# Patient Record
Sex: FEMALE | Race: WHITE | Employment: UNEMPLOYED | URBAN - NONMETROPOLITAN AREA
[De-identification: names, ages, dates, MRNs, and addresses within clinical notes are randomized per-mention and may not be internally consistent; named-entity substitution may affect disease eponyms.]

---

## 2022-02-17 ENCOUNTER — APPOINTMENT (OUTPATIENT)
Dept: CT IMAGING | Age: 32
DRG: 885 | End: 2022-02-17
Attending: PSYCHIATRY & NEUROLOGY
Payer: MEDICAID

## 2022-02-17 ENCOUNTER — HOSPITAL ENCOUNTER (INPATIENT)
Age: 32
LOS: 5 days | Discharge: HOME | DRG: 885 | End: 2022-02-22
Attending: PSYCHIATRY & NEUROLOGY | Admitting: PSYCHIATRY & NEUROLOGY
Payer: MEDICAID

## 2022-02-17 DIAGNOSIS — F43.10 PTSD (POST-TRAUMATIC STRESS DISORDER): Primary | ICD-10-CM

## 2022-02-17 LAB — TSH SERPL DL<=0.05 MIU/L-ACNC: 1.97 UIU/ML (ref 0.27–4.2)

## 2022-02-17 PROCEDURE — 85025 COMPLETE CBC W/AUTO DIFF WBC: CPT

## 2022-02-17 PROCEDURE — 90792 PSYCH DIAG EVAL W/MED SRVCS: CPT | Performed by: NURSE PRACTITIONER

## 2022-02-17 PROCEDURE — 74177 CT ABD & PELVIS W/CONTRAST: CPT

## 2022-02-17 PROCEDURE — 6370000000 HC RX 637 (ALT 250 FOR IP): Performed by: NURSE PRACTITIONER

## 2022-02-17 PROCEDURE — 84443 ASSAY THYROID STIM HORMONE: CPT

## 2022-02-17 PROCEDURE — 83690 ASSAY OF LIPASE: CPT

## 2022-02-17 PROCEDURE — 82607 VITAMIN B-12: CPT

## 2022-02-17 PROCEDURE — 36415 COLL VENOUS BLD VENIPUNCTURE: CPT

## 2022-02-17 PROCEDURE — 1240000000 HC EMOTIONAL WELLNESS R&B

## 2022-02-17 PROCEDURE — 6360000004 HC RX CONTRAST MEDICATION: Performed by: FAMILY MEDICINE

## 2022-02-17 PROCEDURE — 86140 C-REACTIVE PROTEIN: CPT

## 2022-02-17 PROCEDURE — 80053 COMPREHEN METABOLIC PANEL: CPT

## 2022-02-17 PROCEDURE — 6370000000 HC RX 637 (ALT 250 FOR IP): Performed by: PSYCHIATRY & NEUROLOGY

## 2022-02-17 PROCEDURE — 82306 VITAMIN D 25 HYDROXY: CPT

## 2022-02-17 RX ORDER — ACETAMINOPHEN 325 MG/1
650 TABLET ORAL EVERY 4 HOURS PRN
Status: DISCONTINUED | OUTPATIENT
Start: 2022-02-17 | End: 2022-02-22 | Stop reason: HOSPADM

## 2022-02-17 RX ORDER — CLONAZEPAM 0.5 MG/1
0.5 TABLET ORAL 2 TIMES DAILY PRN
Status: DISCONTINUED | OUTPATIENT
Start: 2022-02-17 | End: 2022-02-19

## 2022-02-17 RX ORDER — POLYETHYLENE GLYCOL 3350 17 G/17G
17 POWDER, FOR SOLUTION ORAL DAILY PRN
Status: DISCONTINUED | OUTPATIENT
Start: 2022-02-17 | End: 2022-02-22 | Stop reason: HOSPADM

## 2022-02-17 RX ORDER — ASENAPINE MALEATE 2.5 MG/1
2.5 TABLET SUBLINGUAL 2 TIMES DAILY PRN
Status: DISCONTINUED | OUTPATIENT
Start: 2022-02-17 | End: 2022-02-22 | Stop reason: HOSPADM

## 2022-02-17 RX ORDER — PANTOPRAZOLE SODIUM 40 MG/1
40 TABLET, DELAYED RELEASE ORAL
Status: DISCONTINUED | OUTPATIENT
Start: 2022-02-17 | End: 2022-02-22 | Stop reason: HOSPADM

## 2022-02-17 RX ORDER — LITHIUM CARBONATE 300 MG/1
300 CAPSULE ORAL 2 TIMES DAILY WITH MEALS
Status: DISCONTINUED | OUTPATIENT
Start: 2022-02-17 | End: 2022-02-21

## 2022-02-17 RX ORDER — DOXEPIN HYDROCHLORIDE 10 MG/1
10 CAPSULE ORAL NIGHTLY PRN
Status: DISCONTINUED | OUTPATIENT
Start: 2022-02-17 | End: 2022-02-18

## 2022-02-17 RX ORDER — NICOTINE 21 MG/24HR
1 PATCH, TRANSDERMAL 24 HOURS TRANSDERMAL DAILY
Status: DISCONTINUED | OUTPATIENT
Start: 2022-02-17 | End: 2022-02-17

## 2022-02-17 RX ADMIN — LITHIUM CARBONATE 300 MG: 300 CAPSULE, GELATIN COATED ORAL at 11:25

## 2022-02-17 RX ADMIN — LITHIUM CARBONATE 300 MG: 300 CAPSULE, GELATIN COATED ORAL at 17:00

## 2022-02-17 RX ADMIN — ACETAMINOPHEN 650 MG: 325 TABLET ORAL at 12:59

## 2022-02-17 RX ADMIN — IOPAMIDOL 90 ML: 755 INJECTION, SOLUTION INTRAVENOUS at 16:36

## 2022-02-17 RX ADMIN — DOXEPIN HYDROCHLORIDE 10 MG: 10 CAPSULE ORAL at 21:11

## 2022-02-17 RX ADMIN — PANTOPRAZOLE SODIUM 40 MG: 40 TABLET, DELAYED RELEASE ORAL at 11:44

## 2022-02-17 RX ADMIN — CLONAZEPAM 0.5 MG: 0.5 TABLET ORAL at 13:34

## 2022-02-17 ASSESSMENT — SLEEP AND FATIGUE QUESTIONNAIRES
DIFFICULTY FALLING ASLEEP: YES
DIFFICULTY STAYING ASLEEP: YES
RESTFUL SLEEP: YES
AVERAGE NUMBER OF SLEEP HOURS: 6
DIFFICULTY ARISING: NO
DO YOU HAVE DIFFICULTY SLEEPING: YES
DO YOU USE A SLEEP AID: YES

## 2022-02-17 ASSESSMENT — LIFESTYLE VARIABLES: HISTORY_ALCOHOL_USE: NO

## 2022-02-17 ASSESSMENT — PAIN SCALES - GENERAL: PAINLEVEL_OUTOF10: 6

## 2022-02-17 ASSESSMENT — PATIENT HEALTH QUESTIONNAIRE - PHQ9: SUM OF ALL RESPONSES TO PHQ QUESTIONS 1-9: 27

## 2022-02-17 NOTE — FLOWSHEET NOTE
Patient offered Psychiatric Advance Directive and patient said that she thinks she wants her mom to make decisions for her. \"I feel like I'm a little kid right now. \"

## 2022-02-17 NOTE — PROGRESS NOTES
Admission Note      Reason for admission/Target Symptom: Patient was a direct admit from [address] to [facility] due after bad outsburts of rage, Ignacio Seip took me off of all my meds and put me on Seroquel and it hasn't helped. It's made it worse. I went and saw my psychiatrist and I was shaking and crying and I didn't know what to do. He asked me if I was suicidal and I kept  saying \"no no no\" but when I got home all I could think to do was going to the kitchen and getting a knife and slitting my wrist and ending it. \" Pt reports depression, anxiety, decreased appetite, and fatigue. Pt reports a hx of cutting from ages [age]. Diagnoses: Bipolar I Disorder; PTSD; Depression; Anxiety    UDS: Negative  BAL: Negative <10     SW met with treatment team to discuss patient's treatment including care planning, discharge planning, and follow-up needs. Pt has been admitted to [facility]. Treatment team has identified patient's discharge needs as medication management and outpatient therapy/counseling. Pt confirmed  the need for ongoing treatment post inpatient stay. Pt was also provided a handout of contact information for drug and alcohol treatment centers and other community support service such as MISTY, AA, and [facility] Recovery.
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
## 2022-02-17 NOTE — H&P
Behavioral Services  Medicare Certification Upon Admission    I certify that this patient's inpatient psychiatric hospital admission is medically necessary for:    [x] (1) Treatment which could reasonably be expected to improve this patient's condition,       [] (2) Or for diagnostic study;     AND     [x](2) The inpatient psychiatric services are provided while the individual is under the care of a physician and are included in the individualized plan of care.     Estimated length of stay/service 3 days- 5 weeks    Plan for post-hospital care: TBA    Electronically signed by OJSE Lopez on 2/17/2022 at 10:50 AM

## 2022-02-17 NOTE — PROGRESS NOTES
Patient had Klonopin in her home medications that was stored in a plastic tub. Counted meds contained in the bottle with Yola Estrada and sealed bottle in envelope and placed back in tub and tub was then placed, also containing her other home meds, back in the pharmacy cabinet.

## 2022-02-17 NOTE — PROGRESS NOTES
Admission Note      Reason for admission/Target Symptom: Patient admitted to [facility] due to Patient had Klonopin in her home medications that was stored in a plastic tub. Counted meds contained in the bottle with Beth Brewer and sealed bottle in envelope and placed back in tub and tub was then placed, also containing her other home meds, back in the pharmacy cabinet  Diagnoses: Depression NOS  UDS: Neg  BAL:  Neg    SW met with treatment team to discuss patient's treatment including care planning, discharge planning, and follow-up needs. Pt has been admitted to [facility]. Treatment team has identified patient's discharge needs as medication management and outpatient therapy/counseling. Pt confirmed  the need for ongoing treatment post inpatient stay. Pt was also provided a handout of contact information for drug and alcohol treatment centers and other community support service such as MISTY, AA, and [facility].
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
## 2022-02-17 NOTE — PROGRESS NOTES
Treatment Team Note:    BELINDA met with [id number] team to discuss Pts [address] plans. Progress/Behavior/Group Attendance: TBD    Target Symptoms/Reason for admission: Patient admitted to [facility] due to Patient had Klonopin in her home medications that was stored in a plastic tub. Counted meds contained in the bottle with Mickie Councilman and sealed bottle in envelope and placed back in tub and tub was then placed, also containing her other home meds, back in the pharmacy cabinet  Diagnoses: Bipolar I Disorder, Borderline Personality Disorder, PTSD  Tobacco use disorder   UDS: Neg  BAL:  Neg  AftercarePlan: [address] lives with: SW will meet with pt to gather information. Collateral obtained from: SW will meet with pt to gather release of information.   On:    Family Session: STEPHEN    Misc:

## 2022-02-18 ENCOUNTER — APPOINTMENT (OUTPATIENT)
Dept: CT IMAGING | Age: 32
DRG: 885 | End: 2022-02-18
Attending: PSYCHIATRY & NEUROLOGY
Payer: MEDICAID

## 2022-02-18 LAB
ALBUMIN SERPL-MCNC: 4.7 G/DL (ref 3.5–5.2)
ALP BLD-CCNC: 78 U/L (ref 35–104)
ALT SERPL-CCNC: 14 U/L (ref 5–33)
ANION GAP SERPL CALCULATED.3IONS-SCNC: 10 MMOL/L (ref 7–19)
AST SERPL-CCNC: 14 U/L (ref 5–32)
BACTERIA: ABNORMAL /HPF
BASOPHILS ABSOLUTE: 0 K/UL (ref 0–0.2)
BASOPHILS RELATIVE PERCENT: 0.4 % (ref 0–1)
BILIRUB SERPL-MCNC: <0.2 MG/DL (ref 0.2–1.2)
BILIRUBIN URINE: NEGATIVE
BLOOD, URINE: NEGATIVE
BUN BLDV-MCNC: 10 MG/DL (ref 6–20)
C-REACTIVE PROTEIN: <0.3 MG/DL (ref 0–0.5)
CALCIUM SERPL-MCNC: 9.7 MG/DL (ref 8.6–10)
CHLORIDE BLD-SCNC: 103 MMOL/L (ref 98–111)
CLARITY: CLEAR
CO2: 27 MMOL/L (ref 22–29)
COLOR: YELLOW
CREAT SERPL-MCNC: 0.7 MG/DL (ref 0.5–0.9)
CRYSTALS, UA: ABNORMAL /HPF
EOSINOPHILS ABSOLUTE: 0.1 K/UL (ref 0–0.6)
EOSINOPHILS RELATIVE PERCENT: 1.7 % (ref 0–5)
EPITHELIAL CELLS, UA: 3 /HPF (ref 0–5)
GFR AFRICAN AMERICAN: >59
GFR NON-AFRICAN AMERICAN: >60
GLUCOSE BLD-MCNC: 107 MG/DL (ref 74–109)
GLUCOSE URINE: NEGATIVE MG/DL
HCT VFR BLD CALC: 43.1 % (ref 37–47)
HEMOGLOBIN: 13.3 G/DL (ref 12–16)
HYALINE CASTS: 1 /HPF (ref 0–8)
IMMATURE GRANULOCYTES #: 0 K/UL
KETONES, URINE: NEGATIVE MG/DL
LEUKOCYTE ESTERASE, URINE: ABNORMAL
LIPASE: 16 U/L (ref 13–60)
LYMPHOCYTES ABSOLUTE: 1.8 K/UL (ref 1.1–4.5)
LYMPHOCYTES RELATIVE PERCENT: 23 % (ref 20–40)
MCH RBC QN AUTO: 31.8 PG (ref 27–31)
MCHC RBC AUTO-ENTMCNC: 30.9 G/DL (ref 33–37)
MCV RBC AUTO: 103.1 FL (ref 81–99)
MONOCYTES ABSOLUTE: 0.5 K/UL (ref 0–0.9)
MONOCYTES RELATIVE PERCENT: 5.8 % (ref 0–10)
NEUTROPHILS ABSOLUTE: 5.3 K/UL (ref 1.5–7.5)
NEUTROPHILS RELATIVE PERCENT: 69 % (ref 50–65)
NITRITE, URINE: NEGATIVE
PDW BLD-RTO: 13 % (ref 11.5–14.5)
PH UA: 7 (ref 5–8)
PLATELET # BLD: 296 K/UL (ref 130–400)
PMV BLD AUTO: 10.5 FL (ref 9.4–12.3)
POTASSIUM SERPL-SCNC: 4.3 MMOL/L (ref 3.5–5)
PROTEIN UA: NEGATIVE MG/DL
RBC # BLD: 4.18 M/UL (ref 4.2–5.4)
RBC UA: 2 /HPF (ref 0–4)
SODIUM BLD-SCNC: 140 MMOL/L (ref 136–145)
SPECIFIC GRAVITY UA: 1.01 (ref 1–1.03)
TOTAL PROTEIN: 6.6 G/DL (ref 6.6–8.7)
UROBILINOGEN, URINE: 0.2 E.U./DL
VITAMIN B-12: 648 PG/ML (ref 211–946)
VITAMIN D 25-HYDROXY: 33.8 NG/ML
WBC # BLD: 7.7 K/UL (ref 4.8–10.8)
WBC UA: 6 /HPF (ref 0–5)

## 2022-02-18 PROCEDURE — 1240000000 HC EMOTIONAL WELLNESS R&B

## 2022-02-18 PROCEDURE — 6370000000 HC RX 637 (ALT 250 FOR IP): Performed by: PSYCHIATRY & NEUROLOGY

## 2022-02-18 PROCEDURE — 6370000000 HC RX 637 (ALT 250 FOR IP): Performed by: NURSE PRACTITIONER

## 2022-02-18 PROCEDURE — 70450 CT HEAD/BRAIN W/O DYE: CPT

## 2022-02-18 PROCEDURE — 81001 URINALYSIS AUTO W/SCOPE: CPT

## 2022-02-18 PROCEDURE — 99231 SBSQ HOSP IP/OBS SF/LOW 25: CPT | Performed by: NURSE PRACTITIONER

## 2022-02-18 RX ORDER — DOCUSATE SODIUM 100 MG/1
100 CAPSULE, LIQUID FILLED ORAL DAILY
Status: DISCONTINUED | OUTPATIENT
Start: 2022-02-18 | End: 2022-02-22 | Stop reason: HOSPADM

## 2022-02-18 RX ORDER — DOXEPIN HYDROCHLORIDE 25 MG/1
25 CAPSULE ORAL NIGHTLY PRN
Status: DISCONTINUED | OUTPATIENT
Start: 2022-02-18 | End: 2022-02-22 | Stop reason: HOSPADM

## 2022-02-18 RX ADMIN — DOXEPIN HYDROCHLORIDE 25 MG: 25 CAPSULE ORAL at 21:36

## 2022-02-18 RX ADMIN — CLONAZEPAM 0.5 MG: 0.5 TABLET ORAL at 22:45

## 2022-02-18 RX ADMIN — LITHIUM CARBONATE 300 MG: 300 CAPSULE, GELATIN COATED ORAL at 08:18

## 2022-02-18 RX ADMIN — PANTOPRAZOLE SODIUM 40 MG: 40 TABLET, DELAYED RELEASE ORAL at 08:17

## 2022-02-18 RX ADMIN — ASENAPINE MALEATE 2.5 MG: 2.5 TABLET SUBLINGUAL at 00:01

## 2022-02-18 RX ADMIN — DOCUSATE SODIUM 100 MG: 100 CAPSULE, LIQUID FILLED ORAL at 14:08

## 2022-02-18 RX ADMIN — ACETAMINOPHEN 650 MG: 325 TABLET ORAL at 20:10

## 2022-02-18 RX ADMIN — CLONAZEPAM 0.5 MG: 0.5 TABLET ORAL at 15:40

## 2022-02-18 RX ADMIN — DOCUSATE SODIUM 100 MG: 100 CAPSULE, LIQUID FILLED ORAL at 15:38

## 2022-02-18 RX ADMIN — LITHIUM CARBONATE 300 MG: 300 CAPSULE, GELATIN COATED ORAL at 17:40

## 2022-02-18 RX ADMIN — ASENAPINE MALEATE 2.5 MG: 2.5 TABLET SUBLINGUAL at 11:33

## 2022-02-18 ASSESSMENT — PAIN SCALES - GENERAL
PAINLEVEL_OUTOF10: 2
PAINLEVEL_OUTOF10: 7

## 2022-02-18 NOTE — PROGRESS NOTES
BELINDA met with [address] [place] [address] team to discuss Pts [place] and DC plans.      Progress/Behavior/Group Attendance: TBD     Target Symptoms/Reason for admission: Patient admitted to [facility] due to Patient had Klonopin in her home medications that was stored in a plastic tub. Counted meds contained in the bottle with Yecenia Naylor and sealed bottle in envelope and placed back in tub and tub was then placed, also containing her other home meds, back in the pharmacy cabinet  Diagnoses: Bipolar I Disorder, Borderline Personality Disorder, PTSD  Tobacco use disorder   UDS: Neg  BAL:  Neg  AftercarePlan: [address] lives with: SW will meet with pt to gather information.     Collateral obtained from: SW will meet with pt to gather release of information.   On:     Family Session: STEPHEN     Misc:

## 2022-02-18 NOTE — PLAN OF CARE
Problem: Health Maintenance - Impaired:  Goal: Ability to perform activities of daily living will improve  Description: Ability to perform activities of daily living will improve  Outcome: Ongoing     Group Therapy Note     Date: 2/18/2022  Start Time: 1000  End Time:  7557  Number of Participants: 9     Type of Group: Psychotherapy     Patient's Goal: Patient will process emotions and actions and how to relate to other or their with others. Patient discussed open communication and feelings and emotions. Notes:  Patient attended process group as scheduled, patient identified a issue to work on today regarding how they will interact and relate to others. Status After Intervention:  Improved     Participation Level:  Active Listener     Participation Quality: Appropriate, Attentive, and Sharing        Speech:  normal        Thought Process/Content: Logical        Affective Functioning: Congruent        Mood: euthymic        Level of consciousness:  Alert        Response to Learning: Able to verbalize current knowledge/experience        Endings: None Reported     Modes of Intervention: Education, Support, and Socialization        Discipline Responsible: /Counselor        Signature:  Clark Goltz, [facility]

## 2022-02-18 NOTE — PROGRESS NOTES
WRAP UP GROUP NOTE    Patient's Goal:  Providing feedback as to their own progress in the care-plan provided. Patients have an opportunity to explore self-reflective skills and share any additional cares and concerns not yet addressed. Pt effectively participated.     Energy Level:low  Appetite:improving  Concentration:improving  Hallucinations:less  Depression:improved  Anxiety:on/off  How I worked today:tried a little  What helps me sleep:relax  Any questions/complaints/comments:    Electronically signed by Jennifer Webster RN on 2/17/2022 at 9:35 PM

## 2022-02-18 NOTE — PROGRESS NOTES
Requirement Note     SW met with pt to complete Psychosocial and CSSR-S on this date. Patients long and short term goals discussed. Patient voiced understanding. Treatment plan sheet signed. Patient verbalized understanding of the treatment plan. Patient participated in goals and objectives of the treatment plan. Patient discussed safety plan with . SW explained treatment goals with pt. Decreasing depression and anxiety by improvement of positive coping patterns was discussed. Pt acknowledged understanding of treatment goals and signed treatment plan signature sheet. In the last 6 months has the pt been a danger to self: YES  In the last 6 months has the pt been a danger to others: NO  Legal Guardian/POA: NO     Provided patient with [employer] Online handout entitled \"Quitting Smoking. \"  Reviewed handout with patient: addressing dangers of smoking, developing coping skills, and providing basic information about quitting. Patient received all components practical counseling of tobacco practical counseling during the hospital stay.

## 2022-02-18 NOTE — FLOWSHEET NOTE
02/17/22 2027   Encounter Summary   Services provided to: Patient   Referral/Consult From: Nurse   Support System Parent; Family members   Complexity of Encounter High   Length of Encounter 1 hour   Spiritual/Mormon   Type Spiritual struggle   Assessment Tearful; Anxious; Angry   Intervention Active listening;Explored feelings, thoughts, concerns;Prayer   Outcome Expressed gratitude     Gabriela was willing to receive God's help resolving her issues, including healing of mental disorder. Discussed her relationship with God, with self, and family members, her daughter and son. She is thankful for spiritual support and will get a Bible from the nurse.    Electronically signed by Tonio Cazares on 2/17/2022 at 8:37 PM

## 2022-02-18 NOTE — PROGRESS NOTES
[address] [place] [address]      Psychiatric Progress Note    Name:  Harleen Chowdhury  Date:  2/18/2022  Age:  36 y.o. Sex:  female  Ethnicity:   Primary Care Physician:  Deangelo Wong MD, MD   Patient Care Team:  Patient Care Team:  Deangelo Wong MD as PCP - General (Family Medicine)  Chief Complaint: \"I just want my brain to stop, I just want to die. \"        Historian:patient  Complaint Type: anxiety, decreased appetite, depression, irritability, loss of interest in favorite activities, mood swings and sleep disturbance  Course of Symptoms: ongoing  Precipitating Factors: history of mental illness      Subjective  Nursing notes reviewed and patient had no behavioral issues during the night. As needed medications administered during the night include Saphris. Today she endorses suicidal ideation with no specific plan. She states, \"I just want to be dead and I want my brain to stop. \"  She denies homicidal ideation and psychosis. She is emotionally labile at this time. She was in group therapy and left group therapy slammed the door and went to her room and began hysterically crying. She states, \"I just want my brain to stop, I just want to die. \"  Today she reports that she has a history of pseudoseizures. She is also worried about ongoing symptoms since having COVID last month. She endorses numbness of her body that only happens on the right side as well as dizziness and migraines since having COVID. She becomes very upset gets up from her bed and yells \"there is something wrong with my brain this is not just mental illness! \"  Today she reports depression as an 8 and anxiety as a 5 on a 0-to-10 scale. She becomes tearful when talking about her oldest daughter whom her aunt and uncle have custody of. Reports that she has a 11year-old son who she feels is scared of her because of her frequent psychiatric hospitalizations.   She was educated on borderline personality disorder and states, Rubén Shipman has no one told me this before this describes me exactly. \" She was administered a prn Saphris to help with her agitation and was able to lay down and rest.  Patient reports sleep as \"it was terrible. \"  Patient has been calm and cooperative with staff and peers. Patient has been compliant with medications. Patient has been attending groups. Patient reports appetite as            Patient reports no side effects from medications. Objective  Vitals:    02/18/22 0911   BP: 129/71   Pulse: 85   Resp: 20   Temp: 96.9 °F (36.1 °C)   SpO2: 100%       Previous Psychiatric/Substance Use History      Medical History:  No past medical history on file. HOLT History:   Social History     Substance and Sexual Activity   Alcohol Use Not on file         Social History     Substance and Sexual Activity   Drug Use Not on file        Social History     Tobacco Use   Smoking Status Not on file   Smokeless Tobacco Not on file        Family History:     No family history on file. Mental Status:  Level of consciousness:  within normal limits and awake  Appearance:  well-appearing, street clothes, seated in bed, good grooming and good hygiene  Behavior/Motor:  agitated  Attitude toward examiner:  cooperative, attentive and good eye contact  Speech:  loud  Mood:  \"I just need my brain to stop. \"  Affect:  intense  Thought processes:  rapid  Thought content:  Homocidal ideation :denies  Suicidal Ideation:  active  Delusions:  no evidence of delusions  Perceptual Disturbance:  denies any perceptual disturbance  Cognition:  oriented to person, place, and time  Concentration : poor  Memory intact for recent   Fund of knowledge:  average  Abstract thinking: adequate  Insight: poor  Judgment:  poor     lithium  300 mg Oral BID WC    nicotine  1 patch TransDERmal Daily    pantoprazole  40 mg Oral QAM AC       Current Medications:  Current Facility-Administered Medications   Medication Dose Route Frequency Provider Last Rate Last Admin  acetaminophen (TYLENOL) tablet 650 mg  650 mg Oral Q4H PRN Valerio Dyer MD   650 mg at 02/17/22 1259    polyethylene glycol (GLYCOLAX) packet 17 g  17 g Oral Daily PRN Valerio Dyer MD        lithium capsule 300 mg  300 mg Oral BID WC Aura Smoot, APRN   300 mg at 02/18/22 0818    asenapine maleate (SAPHRIS) sublingual tablet 2.5 mg  2.5 mg SubLINGual BID PRN Cristhiana Smoot, APRN   2.5 mg at 02/18/22 1133    doxepin (SINEQUAN) capsule 10 mg  10 mg Oral Nightly PRN Cristhiana Leeroy, APRN   10 mg at 02/17/22 2111    nicotine (NICODERM CQ) 7 MG/24HR 1 patch  1 patch TransDERmal Daily Monalisa Umaña APRN   1 patch at 02/17/22 1124    pantoprazole (PROTONIX) tablet 40 mg  40 mg Oral QAM AC Christy Winn, APRN   40 mg at 02/18/22 0550    clonazePAM (KLONOPIN) tablet 0.5 mg  0.5 mg Oral BID PRN Cristhiana Smoot, APRN   0.5 mg at 02/17/22 1334       Psychotherapy:   SUPPORTIVE    DSM V Diagnoses: Active Problems:    Borderline personality disorder ([address]    Bipolar I disorder with mixed features ([address]    Tobacco use disorder    PTSD (post-traumatic stress disorder)  Resolved Problems:    * No resolved hospital problems. *            Plan:    Encourage group therapy  15 minute safety checks  Medical monitoring by Dr. Bel Ureña and associates  Continue current therapy and medications  Social work to obtain collateral   CT BRAIN WITHOUT CONTRAST- NUMBNESS ON RIGHT SIDE OF HER BODY, DIZZINESS AND MIGRAINES SINCE HAVING COVID LAST MONTH    Amount of time spent with patient: 15 minutes with greater than 50% of the time spent in counseling and collaboration of care.     JOSE Villaseñor  Clinician Signature: signed electronically

## 2022-02-18 NOTE — PROGRESS NOTES
[facility] Adult Unit Daily Assessment  Nursing Progress Note    Room: [facility]/610-01   Name: Torri Aguayo   Age: 36 y.o. Gender: female   Dx: <principal problem not specified>  Precautions: suicide risk  Inpatient Status: voluntary       SLEEP:    Sleep Quality Poor   Sleep Medications: No   PRN Sleep Meds: Yes       MEDICAL:    Other PRN Meds: Yes   Med Compliant: Yes  Accu-Chek: No  Oxygen/CPAP/BiPAP: No  CIWA/CINA: No   PAIN Assessment: none  Side Effects from medication: No    Is Patient experiencing any respiratory symptoms (headache, fever, body aches, cough. Lex Nathan ): no  Patient educated by nursing to practice social distancing, wear masks, wash hands frequently: yes      PSYCH:    Depression: 6   Anxiety: 5   SI active   HI Negative for homicidal ideation      AVH:Absent      GENERAL:    Appetite: good    Social: Yes   Speech: normal   Appearance: appropriately dressed and healthy looking    GROUP:    Group Participation: Yes  Participation Quality: Active Listener    Notes: Patient in room, laying in bed at this encounter. Eye contact was good. PT says she has no trouble falling asleep but has trouble staying asleep. Patient is still having SI with a plan to cut wrists. She states that she constantly has thoughts of harming herself but no one else. Calm, cooperative and social with staff. PT mentioned also that she is on prazosin at home and it is not been RX'd to be taken while [facility].      Electronically signed by Iraida Diop RN on 2/17/22 at 9:32 PM CST

## 2022-02-18 NOTE — PLAN OF CARE
Group Therapy Note     Date: 2/18/2022  Start Time: 1100  End Time:  1255  Number of Participants: 8     Type of Group: Psychoeducation     Wellness Binder Information  Module Name:  staying well  Session Number:  1     Patient's Goal:  daily maintenance coping skills     Notes:  pt was verbally prompted to attend group. Pt refused. Information about staying well was provided. Status After Intervention:       Participation Level:      Participation Quality:         Speech:           Thought Process/Content:         Affective Functioning:         Mood:         Level of consciousness:          Response to Learning:         Endings:      Modes of Intervention:         Discipline Responsible: Psychoeducational Specialist        Signature:  Flavio Landis

## 2022-02-18 NOTE — PROGRESS NOTES
Called and informed Dr. Ewing Galeazzi and Gurinder Cervantes about final results of patient's CT scan.

## 2022-02-18 NOTE — PROGRESS NOTES
Treatment Team Note:     BELINDA met with [id number] team to discuss Pts TX and DC plans.      Progress/Behavior/Group Attendance: TBD     Target Symptoms/Reason for admission: Patient admitted to [facility] due to Patient had Klonopin in her home medications that was stored in a plastic tub. Counted meds contained in the bottle with Mena Polite and sealed bottle in envelope and placed back in tub and tub was then placed, also containing her other home meds, back in the pharmacy cabinet  Diagnoses: Bipolar I Disorder, Borderline Personality Disorder, PTSD  Tobacco use disorder   UDS: Neg  BAL:  Neg  AftercarePlan: [address] lives with: SW will meet with pt to gather information.     Collateral obtained from: SW will meet with pt to gather release of information.   On:     Family Session: STEPHEN     Misc:

## 2022-02-18 NOTE — PROGRESS NOTES
[facility] Adult Unit Daily Assessment  Nursing Progress Note    Room: [facility]610-01   Name: Isela Holter   Age: 36 y.o. Gender: female   Dx: <principal problem not specified>  Precautions: suicide risk  Inpatient Status: voluntary       SLEEP:    Sleep Quality Poor  Sleep Medications:    PRN Sleep Meds:        MEDICAL:    Other PRN Meds:    Med Compliant:   Accu-Chek:   Oxygen/CPAP/BiPAP:   CIWA/CINA:    PAIN Assessment: none  Side Effects from medication: No    Is Patient experiencing any respiratory symptoms (headache, fever, body aches, cough. Aki Skates ): no  Patient educated by nursing to practice social distancing, wear masks, wash hands frequently: yes      PSYCH:    Depression: 9   Anxiety: 9   SI active   HI Negative for homicidal ideation      AVH:Absent      GENERAL:    Appetite: decreased    Social: No   Speech: normal   Appearance: appropriately dressed and healthy looking    GROUP:    Group Participation: No  Participation Quality: Minimal    Notes:  Patient reports nausea. She had refused her Protonix earlier. She is tearful and requesting it now. She is in her room crying sitting in the bathroom floor. She agree to lie down. She states she has nothing to live for. \" my kids don't need me anymore\" I just want my body to stop hurting\" offered her Saphris. She refused stating \"the last one made my tongue numb and I passed out. Yuki Adam, APRN here and aware of behavior. Saphris given. Patient calm and has been in the day area for most of the remainder of the day playing games and laughing. Urine obtained. She previously had CT of head.          Electronically signed by Carolina Daugherty RN on 2/18/22 at 11:45 AM CST

## 2022-02-18 NOTE — H&P
HISTORY and PHYSICAL      CHIEF COMPLAINT:  Depression, SI    Reason for Admission:  Depression, SI    History Obtained From:  Patient, chart    HISTORY OF PRESENT ILLNESS:      The patient is a 36 y.o. female who is admitted to the [place] [address] unit with worsening mood issues. She has no c/o CP or SOA. She has c/o lower abdominal pain. No N/V. She is eating. No changes with BM's. No dysuria. No fevers. No HA or dizziness. Past Medical History:    No past medical history on file. Past Surgical History:    No past surgical history on file. Medications Prior to Admission:    No medications prior to admission. Allergies:  Latex and Morphine    Social History:   TOBACCO:   has no history on file for tobacco use. ETOH:   has no history on file for alcohol use. DRUGS:   has no history on file for drug use. MARITAL STATUS:  single  OCCUPATION:  Not working  Patient currently lives with family       Family History:   No family history on file. REVIEW OF SYSTEMS:  Constitutional: neg  CV: neg  Pulmonary: neg  GI: lower abdominal pain  : neg  Psych: depression, SI  Neuro: neg  Skin: neg  MusculoSkeletal: neg  HEENT: neg  Joints: neg    Vitals:  /80   Pulse 67   Temp 97.3 °F (36.3 °C) (Temporal)   Resp 18   Ht 5' 7\" (1.702 m)   Wt 222 lb (100.7 kg)   SpO2 98%   BMI 34.77 kg/m²     PHYSICAL EXAM:  Gen: NAD, alert  HEENT: WNL  Lymph: no LAD  Neck: no JVD or masses  Chest: CTA bilat  CV: RRR  Abdomen: NT/ND  Extrem: no C/C/E  Neuro: non focal  Skin: no rashes  Joints: no redness    DATA:  I have reviewed the admission labs and imaging tests.     ASSESSMENT AND PLAN:      Active Problems:    Borderline personality disorder---follow with Psych    Tobacco use disorder    Abdominal Pain---supportive care, follow        Cassie Avilez MD  7:58 AM 2/18/2022

## 2022-02-19 PROCEDURE — 6370000000 HC RX 637 (ALT 250 FOR IP): Performed by: PSYCHIATRY & NEUROLOGY

## 2022-02-19 PROCEDURE — 99232 SBSQ HOSP IP/OBS MODERATE 35: CPT | Performed by: PSYCHIATRY & NEUROLOGY

## 2022-02-19 PROCEDURE — 6370000000 HC RX 637 (ALT 250 FOR IP): Performed by: NURSE PRACTITIONER

## 2022-02-19 PROCEDURE — 1240000000 HC EMOTIONAL WELLNESS R&B

## 2022-02-19 RX ORDER — PRAZOSIN HYDROCHLORIDE 1 MG/1
1 CAPSULE ORAL NIGHTLY
Status: DISCONTINUED | OUTPATIENT
Start: 2022-02-19 | End: 2022-02-22 | Stop reason: HOSPADM

## 2022-02-19 RX ORDER — HYDROXYZINE HYDROCHLORIDE 25 MG/1
25 TABLET, FILM COATED ORAL 3 TIMES DAILY PRN
Status: DISCONTINUED | OUTPATIENT
Start: 2022-02-19 | End: 2022-02-22 | Stop reason: HOSPADM

## 2022-02-19 RX ORDER — CLONAZEPAM 0.5 MG/1
0.25 TABLET ORAL 2 TIMES DAILY PRN
Status: DISCONTINUED | OUTPATIENT
Start: 2022-02-19 | End: 2022-02-22 | Stop reason: HOSPADM

## 2022-02-19 RX ORDER — GABAPENTIN 600 MG/1
300 TABLET ORAL 3 TIMES DAILY
Status: DISCONTINUED | OUTPATIENT
Start: 2022-02-19 | End: 2022-02-22 | Stop reason: HOSPADM

## 2022-02-19 RX ADMIN — LITHIUM CARBONATE 300 MG: 300 CAPSULE, GELATIN COATED ORAL at 17:23

## 2022-02-19 RX ADMIN — DOCUSATE SODIUM 100 MG: 100 CAPSULE, LIQUID FILLED ORAL at 08:17

## 2022-02-19 RX ADMIN — ACETAMINOPHEN 650 MG: 325 TABLET ORAL at 13:34

## 2022-02-19 RX ADMIN — PRAZOSIN HYDROCHLORIDE 1 MG: 1 CAPSULE ORAL at 21:10

## 2022-02-19 RX ADMIN — DOXEPIN HYDROCHLORIDE 25 MG: 25 CAPSULE ORAL at 21:10

## 2022-02-19 RX ADMIN — PANTOPRAZOLE SODIUM 40 MG: 40 TABLET, DELAYED RELEASE ORAL at 06:29

## 2022-02-19 RX ADMIN — GABAPENTIN 300 MG: 600 TABLET, FILM COATED ORAL at 15:49

## 2022-02-19 RX ADMIN — LITHIUM CARBONATE 300 MG: 300 CAPSULE, GELATIN COATED ORAL at 08:17

## 2022-02-19 RX ADMIN — GABAPENTIN 300 MG: 600 TABLET, FILM COATED ORAL at 21:11

## 2022-02-19 ASSESSMENT — PAIN SCALES - GENERAL
PAINLEVEL_OUTOF10: 8
PAINLEVEL_OUTOF10: 3

## 2022-02-19 ASSESSMENT — PAIN DESCRIPTION - ORIENTATION
ORIENTATION: OTHER (COMMENT)
ORIENTATION: OTHER (COMMENT)

## 2022-02-19 ASSESSMENT — PAIN - FUNCTIONAL ASSESSMENT
PAIN_FUNCTIONAL_ASSESSMENT: ACTIVITIES ARE NOT PREVENTED
PAIN_FUNCTIONAL_ASSESSMENT: ACTIVITIES ARE NOT PREVENTED

## 2022-02-19 ASSESSMENT — PAIN DESCRIPTION - PAIN TYPE
TYPE: ACUTE PAIN
TYPE: ACUTE PAIN

## 2022-02-19 ASSESSMENT — PAIN DESCRIPTION - ONSET
ONSET: ON-GOING
ONSET: ON-GOING

## 2022-02-19 ASSESSMENT — PAIN DESCRIPTION - PROGRESSION
CLINICAL_PROGRESSION: GRADUALLY WORSENING
CLINICAL_PROGRESSION: GRADUALLY IMPROVING

## 2022-02-19 ASSESSMENT — PAIN DESCRIPTION - LOCATION
LOCATION: HEAD
LOCATION: HEAD

## 2022-02-19 ASSESSMENT — PAIN DESCRIPTION - FREQUENCY
FREQUENCY: INTERMITTENT
FREQUENCY: INTERMITTENT

## 2022-02-19 ASSESSMENT — PAIN DESCRIPTION - DESCRIPTORS: DESCRIPTORS: DULL;HEADACHE;PRESSURE

## 2022-02-19 NOTE — PROGRESS NOTES
Group Therapy Note    Start Time: 800  End Time:  900  Number of Participants: 11    Type of Group: Community Meeting       Patient's Goal:  \"Would like to know what my results are about my cat scan of my head\"      Notes:        Participation Level:  Active Listener       Participation Quality: Appropriate      Thought Process/Content: Logical      Affective Functioning: Congruent      Mood: Calm      Level of consciousness:  Alert      Modes of Intervention: Support      Discipline Responsible: Behavioral Health Tech II      Signature:  Neo Borja no rashes,  no suspicious lesions,  no areas of discoloration,  no jaundice present,  good turgor,  no abnormalities, no masses,  no tenderness on palpation

## 2022-02-19 NOTE — PROGRESS NOTES
[facility] Adult Unit Daily Assessment  Nursing Progress Note    Room: 06/610-01   Name: Sofi Trinh   Age: 36 y.o. Gender: female   Dx: <principal problem not specified>  Precautions: suicide risk  Inpatient Status: voluntary       SLEEP:    Sleep Quality Fair  Sleep Medications:    PRN Sleep Meds:        MEDICAL:    Other PRN Meds:    Med Compliant:   Accu-Chek: No  Oxygen/CPAP/BiPAP: No  CIWA/CINA: No   PAIN Assessment: none  Side Effects from medication: No    Is Patient experiencing any respiratory symptoms (headache, fever, body aches, cough. Thresa Abed ): no  Patient educated by nursing to practice social distancing, wear masks, wash hands frequently: yes      PSYCH:    Depression: 4  Anxiety: 7  SI thoughts off and on   HI Negative for homicidal ideation      AVH:Present -         GENERAL:    Appetite: good    Social: Yes   Speech: normal   Appearance: appropriately dressed and healthy looking    GROUP:    Group Participation: Yes  Participation Quality: Active Listener    Notes:  Patient reports nightmares, woke up of and on all night. Reports depression, anxiety, hallucinations, suicidal thoughts. She reports a liquid coming with a bowel movement after she began Colace yesterday. She reports seeing visions out of the corner of her eye, like someone is walking by her. She still reports suicidal ideations. Asking about her CT of the head. Dr. Jolanta Qiu is here and reported to her. She request handouts on her medications.         Electronically signed by Crow Ccaeres RN on 2/19/22 at 9:48 AM CST

## 2022-02-19 NOTE — PROGRESS NOTES
Group Note    Number of Participants in Group: 8  Number of Patients on Unit:11      Patient attended group:Yes  Reason for Absence:  Intervention for patient absence:        Type of Group:   Wrap-Up/Relaxation    Patient's Goal: See wrap up group sheet    Participation Level:     Active           Patient Response to Learning: Yes    Patient's Behavior: Cooperative    Is Patient Social/Interacting: Yes    Relaxation:   Television:Yes   Reading:Yes   Game/Puzzle:Yes   Phone: Yes       Notes/Comments:      Please see patient's wrap up group sheet for patient's comments       Electronically signed by Michell Benavidez on 2/18/22 at 10:21 PM CST

## 2022-02-19 NOTE — PROGRESS NOTES
Progress Note  Gabriela Jacobsen  2/19/2022 1:57 PM  Subjective:   Admit Date:   2/17/2022      CC/ADMIT DX:       Interval History:   Reviewed overnight events and nursing notes. She denies any new physical complaints. I have reviewed all labs/diagnostics from the last 24hrs. ROS:   I have done a 10 point ROS and all are negative, except what is mentioned in the HPI. ADULT DIET; Regular    Medications:      prazosin  1 mg Oral Nightly    gabapentin  300 mg Oral TID    docusate sodium  100 mg Oral Daily    lithium  300 mg Oral BID WC    nicotine  1 patch TransDERmal Daily    pantoprazole  40 mg Oral QAM AC           Objective:   Vitals: /76   Pulse 86   Temp 98.2 °F (36.8 °C) (Temporal)   Resp 18   Ht 5' 7\" (1.702 m)   Wt 222 lb (100.7 kg)   SpO2 99%   BMI 34.77 kg/m²  No intake or output data in the 24 hours ending 02/19/22 1357  General appearance: alert and cooperative with exam  Extremities: extremities normal, atraumatic, no cyanosis or edema  Neurologic:  No obvious focal neurologic deficits. Skin: no rashes    Assessment and Plan: Active Problems:    Borderline personality disorder ([address]    Bipolar I disorder with mixed features ([address]    Tobacco use disorder    PTSD (post-traumatic stress disorder)  Resolved Problems:    * No resolved hospital problems. *         Plan:  1. Continue present medication(s)   2. Follow with Psych  3. Her CT head and UA are ok. Discharge planning:   her home     Reviewed treatment plans with the patient and/or family.              Electronically signed by Raoul Ackerman MD on 2/19/2022 at 1:57 PM

## 2022-02-19 NOTE — PROGRESS NOTES
Department of Psychiatry  Attending Progress Note     Chief complaint: \"I'm anxious\"    SUBJECTIVE:   Chart reviewed, discussed with the team. No major issues overnight. Patient is med-compliant. No SEs. Performs ADLs. Social and goes to groups. Patient seen interacting with a peer today. Denies SI/HI/AVH. Rates depression 4/10, anxiety 7/10. Slept 6h. Denies physical complaints. States she feels hypervigilant. Used to take Prazosin which helped. States she does not want to take Klonopin. Asking for an alternative. Asking about CT head results - discussed. OBJECTIVE    Physical  Wt Readings from Last 3 Encounters:   02/17/22 222 lb (100.7 kg)     Temp Readings from Last 3 Encounters:   02/19/22 98.2 °F (36.8 °C) (Temporal)     BP Readings from Last 3 Encounters:   02/19/22 120/76     Pulse Readings from Last 3 Encounters:   02/19/22 86        Review of Systems: 14-point review of systems negative except as described above    Mental Status Examination:   Appearance:  Stated age. Gait stable. No abnormal movements or tremor. Behavior: Calm, smiled  Speech: Normal in tone, volume, and quality. No slurring, dysarthria or pressured speech noted. Mood: \"Anxious \"   Affect: Mood congruent. Thought Process: Appears linear. Thought Content: Denies SI/HI . No overt delusions or paranoia appreciated. Perceptions: Denies auditory or visual hallucinations at present time. Not responding to internal stimuli. Concentration: Intact. Orientation: to person, place, date, and situation. Language: Intact. Fund of information: Intact. Memory: Recent and remote appear intact. Neurovegitative: Fair appetite and sleep. Insight: Improving. Judgment: Improving.     Data  Lab Results   Component Value Date    WBC 7.7 02/17/2022    HGB 13.3 02/17/2022    HCT 43.1 02/17/2022    .1 (H) 02/17/2022     02/17/2022      Lab Results   Component Value Date     02/17/2022    K 4.3 02/17/2022     02/17/2022    CO2 27 02/17/2022    BUN 10 02/17/2022    CREATININE 0.7 02/17/2022    GLUCOSE 107 02/17/2022    CALCIUM 9.7 02/17/2022    PROT 6.6 02/17/2022    LABALBU 4.7 02/17/2022    BILITOT <0.2 02/17/2022    ALKPHOS 78 02/17/2022    AST 14 02/17/2022    ALT 14 02/17/2022    LABGLOM >60 02/17/2022    GFRAA >59 02/17/2022       Medications    Current Facility-Administered Medications:     doxepin (SINEQUAN) capsule 25 mg, 25 mg, Oral, Nightly PRN, Johanna Fay, APRN, 25 mg at 02/18/22 2136    docusate sodium (COLACE) capsule 100 mg, 100 mg, Oral, Daily, Johanna Nya APRN, 100 mg at 02/19/22 7396    acetaminophen (TYLENOL) tablet 650 mg, 650 mg, Oral, Q4H PRN, Erwin Macdonald MD, 650 mg at 02/18/22 2010    polyethylene glycol (GLYCOLAX) packet 17 g, 17 g, Oral, Daily PRN, Erwin Macdonald MD    lithium capsule 300 mg, 300 mg, Oral, BID WC, Christy Winn APRN, 300 mg at 02/19/22 4885    asenapine maleate (SAPHRIS) sublingual tablet 2.5 mg, 2.5 mg, SubLINGual, BID PRN, Johanna Fay APRN, 2.5 mg at 02/18/22 1133    nicotine (NICODERM CQ) 7 MG/24HR 1 patch, 1 patch, TransDERmal, Daily, Johanna Nya APRN, 1 patch at 02/19/22 7992    pantoprazole (PROTONIX) tablet 40 mg, 40 mg, Oral, QAM AC, Christy Winn APRN, 40 mg at 02/19/22 1803    clonazePAM (KLONOPIN) tablet 0.5 mg, 0.5 mg, Oral, BID PRN, Johanna Nya, APRN, 0.5 mg at 02/18/22 2245    ASSESSMENT AND PLAN  DSM 5 DIAGNOSIS  Impression  Bipolar disorder unspecified  PTSD chronic  Borderline personality disorder      Tobacco use disorder    Continue to observe. Med adjustment. Plan:   1. Psychiatric Medications:   Taper off Klonopin. Order GBP to facilitate. PRN Atarax for anxiety. Restart Prazosin. Continue current psychotropic medications as recommended. Monitor for side effects.   The risks, benefits, side effects, indications, contraindications, alternatives and adverse effects of the medications have been discussed with patient. 2. Continue to provide supportive psychotherapy. Encourage socialization and participation in recreational activities. Work on coping skills. 3. Medical Issues:    Continue medical monitoring by Dr. Peg Garay and associates. 4. Disposition:     to provide outpatient resources and facilitate disposition.      Amount of time spent with patient:      25 minutes with greater than 50 % of the time spent in counseling and collaboration of care

## 2022-02-19 NOTE — PROGRESS NOTES
Progress Note  Gabriela Jacobsen  2/18/2022 6:56 PM  Subjective:   Admit Date:   2/17/2022      CC/ADMIT DX:       Interval History:   Reviewed overnight events and nursing notes. She has c/o dizziness. I have reviewed all labs/diagnostics from the last 24hrs. ROS:   I have done a 10 point ROS and all are negative, except what is mentioned in the HPI. ADULT DIET; Regular    Medications:      docusate sodium  100 mg Oral Daily    lithium  300 mg Oral BID WC    nicotine  1 patch TransDERmal Daily    pantoprazole  40 mg Oral QAM AC           Objective:   Vitals: /71   Pulse 85   Temp 96.9 °F (36.1 °C) (Temporal)   Resp 20   Ht 5' 7\" (1.702 m)   Wt 222 lb (100.7 kg)   SpO2 100%   BMI 34.77 kg/m²  No intake or output data in the 24 hours ending 02/18/22 1856  General appearance: alert and cooperative with exam  Extremities: extremities normal, atraumatic, no cyanosis or edema  Neurologic:  No obvious focal neurologic deficits. Skin: no rashes    Assessment and Plan: Active Problems:    Borderline personality disorder ([address]    Bipolar I disorder with mixed features ([address]    Tobacco use disorder    PTSD (post-traumatic stress disorder)  Resolved Problems:    * No resolved hospital problems. *         Plan:  1. Continue present medication(s)   2. Follow with Psych  3. Her CT and labs show nothing acute. Discharge planning:   her home     Reviewed treatment plans with the patient and/or family.              Electronically signed by Gage Braxton MD on 2/18/2022 at 6:56 PM

## 2022-02-19 NOTE — PROGRESS NOTES
[facility] Adult Unit Daily Assessment  Nursing Progress Note    Room: 0610/610-01   Name: Isela Holter   Age: 36 y.o. Gender: female   Dx: <principal problem not specified>  Precautions: suicide risk  Inpatient Status: voluntary       SLEEP:    Sleep Quality Fair  Sleep Medications: No   PRN Sleep Meds: Yes       MEDICAL:    Other PRN Meds: Yes   Med Compliant: Yes  Accu-Chek: No  Oxygen/CPAP/BiPAP: No  CIWA/CINA: No   PAIN Assessment: says teeth are hurting from breathing cold air present - adequately treated  Side Effects from medication: No    Is Patient experiencing any respiratory symptoms (headache, fever, body aches, cough. [place] ): no  Patient educated by nursing to practice social distancing, wear masks, wash hands frequently: yes      PSYCH:    Depression: 5   Anxiety: 7   SI denies suicidal ideation   HI Negative for homicidal ideation      AVH:Absent      GENERAL:    Appetite: good    Social: Yes   Speech: loud   Appearance: appropriately dressed and healthy looking    GROUP:    Group Participation: yes  Participation Quality: active listener    Notes: PT was in the dinning room most of the evening until 2230. PT came to the nurses desk to ask for her medication. She began to talk about the day and how group upset her because she does not have a relationship with her father and her mother favors her older brother. The pt had good eye contact but her speech was loud and pressured. The pt is also very concerned with getting her medications she takes at home on a consistent schedule. She also told the nurse at this encounter that she did not have SI presently.      Electronically signed by Teri Holcomb RN on 2/19/22 at 2:59 AM CST

## 2022-02-20 PROCEDURE — 6370000000 HC RX 637 (ALT 250 FOR IP): Performed by: PSYCHIATRY & NEUROLOGY

## 2022-02-20 PROCEDURE — 1240000000 HC EMOTIONAL WELLNESS R&B

## 2022-02-20 PROCEDURE — 6370000000 HC RX 637 (ALT 250 FOR IP): Performed by: NURSE PRACTITIONER

## 2022-02-20 RX ADMIN — GABAPENTIN 300 MG: 600 TABLET, FILM COATED ORAL at 14:11

## 2022-02-20 RX ADMIN — DOXEPIN HYDROCHLORIDE 25 MG: 25 CAPSULE ORAL at 22:05

## 2022-02-20 RX ADMIN — DOCUSATE SODIUM 100 MG: 100 CAPSULE, LIQUID FILLED ORAL at 08:31

## 2022-02-20 RX ADMIN — LITHIUM CARBONATE 300 MG: 300 CAPSULE, GELATIN COATED ORAL at 17:48

## 2022-02-20 RX ADMIN — PRAZOSIN HYDROCHLORIDE 1 MG: 1 CAPSULE ORAL at 22:05

## 2022-02-20 RX ADMIN — GABAPENTIN 300 MG: 600 TABLET, FILM COATED ORAL at 22:06

## 2022-02-20 RX ADMIN — GABAPENTIN 300 MG: 600 TABLET, FILM COATED ORAL at 08:31

## 2022-02-20 RX ADMIN — ACETAMINOPHEN 650 MG: 325 TABLET ORAL at 18:23

## 2022-02-20 RX ADMIN — HYDROXYZINE HYDROCHLORIDE 25 MG: 25 TABLET, FILM COATED ORAL at 14:11

## 2022-02-20 RX ADMIN — PANTOPRAZOLE SODIUM 40 MG: 40 TABLET, DELAYED RELEASE ORAL at 06:40

## 2022-02-20 RX ADMIN — LITHIUM CARBONATE 300 MG: 300 CAPSULE, GELATIN COATED ORAL at 08:31

## 2022-02-20 ASSESSMENT — PAIN DESCRIPTION - DESCRIPTORS: DESCRIPTORS: ACHING

## 2022-02-20 ASSESSMENT — PAIN DESCRIPTION - PROGRESSION: CLINICAL_PROGRESSION: NOT CHANGED

## 2022-02-20 ASSESSMENT — PAIN DESCRIPTION - FREQUENCY: FREQUENCY: INTERMITTENT

## 2022-02-20 ASSESSMENT — PAIN SCALES - GENERAL
PAINLEVEL_OUTOF10: 8
PAINLEVEL_OUTOF10: 8

## 2022-02-20 ASSESSMENT — PAIN DESCRIPTION - ORIENTATION: ORIENTATION: LOWER

## 2022-02-20 ASSESSMENT — PAIN DESCRIPTION - PAIN TYPE: TYPE: CHRONIC PAIN

## 2022-02-20 ASSESSMENT — PAIN DESCRIPTION - ONSET: ONSET: ON-GOING

## 2022-02-20 ASSESSMENT — PAIN - FUNCTIONAL ASSESSMENT: PAIN_FUNCTIONAL_ASSESSMENT: ACTIVITIES ARE NOT PREVENTED

## 2022-02-20 ASSESSMENT — PAIN DESCRIPTION - LOCATION: LOCATION: BACK

## 2022-02-20 NOTE — PROGRESS NOTES
Progress Note  Gabriela Jacobsen  2/20/2022 1:21 PM  Subjective:   Admit Date:   2/17/2022      CC/ADMIT DX:       Interval History:   Reviewed overnight events and nursing notes. She has no new medical issues. I have reviewed all labs/diagnostics from the last 24hrs. ROS:   I have done a 10 point ROS and all are negative, except what is mentioned in the HPI. ADULT DIET; Regular    Medications:      prazosin  1 mg Oral Nightly    gabapentin  300 mg Oral TID    docusate sodium  100 mg Oral Daily    lithium  300 mg Oral BID WC    nicotine  1 patch TransDERmal Daily    pantoprazole  40 mg Oral QAM AC           Objective:   Vitals: /60   Pulse 80   Temp 96.3 °F (35.7 °C)   Resp 18   Ht 5' 7\" (1.702 m)   Wt 222 lb (100.7 kg)   SpO2 100%   BMI 34.77 kg/m²  No intake or output data in the 24 hours ending 02/20/22 1321  General appearance: alert and cooperative with exam  Extremities: extremities normal, atraumatic, no cyanosis or edema  Neurologic:  No obvious focal neurologic deficits. Skin: no rashes    Assessment and Plan: Active Problems:    Borderline personality disorder ([address]    Bipolar I disorder with mixed features ([address]    Tobacco use disorder    PTSD (post-traumatic stress disorder)  Resolved Problems:    * No resolved hospital problems. *         Plan:  1. Continue present medication(s)   2. Follow with Psych  3. She is medically stable. I will monitor for any changes or concerns. Discharge planning:   her home     Reviewed treatment plans with the patient and/or family.              Electronically signed by Cassie Avilez MD on 2/20/2022 at 1:21 PM

## 2022-02-20 NOTE — PROGRESS NOTES
[facility] Adult Unit Daily Assessment  Nursing Progress Note    Room: [facility]/610-01   Name: Torri Aguayo   Age: 36 y.o. Gender: female   Dx: <principal problem not specified>  Precautions: suicide risk  Inpatient Status: voluntary       SLEEP:    Sleep Quality Poor  Sleep Medications: Yes   PRN Sleep Meds: Yes       MEDICAL:    Other PRN Meds: Yes   Med Compliant: Yes  Accu-Chek: No  Oxygen/CPAP/BiPAP: No  CIWA/CINA: No   PAIN Assessment: none  Side Effects from medication: No    Is Patient experiencing any respiratory symptoms (headache, fever, body aches, cough. Lex Nathan ): no  Patient educated by nursing to practice social distancing, wear masks, wash hands frequently: yes      PSYCH:    Depression: 4   Anxiety: 5   SI denies suicidal ideation   HI Negative for homicidal ideation      AVH:Absent      GENERAL:    Appetite: good    Social: Yes   Speech: loud   Appearance: appropriately dressed    GROUP:    Group Participation: Yes  Participation Quality: Minimal    Notes:     Patient is cooperative, Alert and Oriented x4, appears Anxious and Pleasant. Patient Rates Depression a 4 and Anxiety a 5 on a 0-10 scale, with 10 being the worst. Patient states that one of her biggest stressors currently is,\" I don't know how I am going to handle my mom when I get out\". Patient affect is Congruent. Exhibited a Brightened facial expression; maintained good  eye contact throughout interview. Patient denies having current [address]. Patient states,\" I don't feel like hurting myself right now, but I had the thoughts today off and on\". Patient reports that she had \"hallucinations like hearing my boyfriend snoring last night in my room\". Patient is compliant with medications and group. No signs of distress noted; Patient observed socializing with peers after interview.        Electronically signed by Dolores Lerma RN on 2/20/22 at 12:31 AM CST

## 2022-02-20 NOTE — GROUP NOTE
Group Therapy Note    Date: 2/20/2022    Group Start Time: 1500  Group End Time: 1600  Group Topic: Healthy Living/Wellness    [facility]    Cynthia Ramesh RN                  Patient's Goal:  Medication education      Status After Intervention:  Improved    Participation Level: Active Listener    Participation Quality: Appropriate and Attentive      Speech:  normal      Thought Process/Content: Logical      Affective Functioning: Congruent        Level of consciousness:  Alert and Oriented x4      Response to Learning: Able to verbalize current knowledge/experience      Endings: None Reported    Modes of Intervention: Education      Discipline Responsible: Registered Nurse      Signature:   Cynthia Ramesh RN

## 2022-02-20 NOTE — PROGRESS NOTES
[facility] Adult Unit Daily Assessment  Nursing Progress Note    Room: 0610/610-01   Name: Sofi Trinh   Age: 36 y.o. Gender: female   Dx: <principal problem not specified>  Precautions: suicide risk  Inpatient Status: voluntary       SLEEP:    Sleep Quality Good  Sleep Medications:    PRN Sleep Meds:        MEDICAL:    Other PRN Meds:    Med Compliant:   Accu-Chek: No  Oxygen/CPAP/BiPAP: No  CIWA/CINA: No   PAIN Assessment: none  Side Effects from medication: No    Is Patient experiencing any respiratory symptoms (headache, fever, body aches, cough no  Patient educated by nursing to practice social distancing, wear masks, wash hands frequently: yes      PSYCH:    Depression: 7   Anxiety: 5   SI denies suicidal ideation   HI Negative for homicidal ideation      AVH:Absent      GENERAL:    Appetite: good    Social: Yes   Speech: normal   Appearance: appropriately dressed and healthy looking    GROUP:    Group Participation: Yes  Participation Quality: Active Listener and Interactive    Notes:  Patient is dressed and in the day area for breakfast and medications. She reports good sleep and appetite at 100% . She reports her mood as \"good and optimistic \". She is social with staff and peers. She is calm and cooperative with staff. She is in the day area playing games and talking. Handouts given on all her medication that she requested.          Electronically signed by Crow Caceres RN on 2/20/22 at 9:28 AM CST

## 2022-02-20 NOTE — PROGRESS NOTES
Group Therapy Note    Start Time: 800  End Time:  101  Number of Participants: 13    Type of Group: Community Meeting       Patient's Goal:  \"talking to someone about when to take my meds so i can have an schedule for when I go home and see about getting a script for the nivatine patch      Notes:      Participation Level:  Active Listener       Participation Quality: Appropriate      Thought Process/Content: Logical      Affective Functioning: Congruent      Mood: calm      Level of consciousness:  Alert      Modes of Intervention: Support      Discipline Responsible: Behavioral Health Tech II      Signature:  Love Dave

## 2022-02-21 PROCEDURE — 6370000000 HC RX 637 (ALT 250 FOR IP): Performed by: NURSE PRACTITIONER

## 2022-02-21 PROCEDURE — 6370000000 HC RX 637 (ALT 250 FOR IP): Performed by: PSYCHIATRY & NEUROLOGY

## 2022-02-21 PROCEDURE — 99231 SBSQ HOSP IP/OBS SF/LOW 25: CPT | Performed by: NURSE PRACTITIONER

## 2022-02-21 PROCEDURE — 1240000000 HC EMOTIONAL WELLNESS R&B

## 2022-02-21 RX ORDER — OXCARBAZEPINE 300 MG/1
300 TABLET, FILM COATED ORAL 2 TIMES DAILY
Status: DISCONTINUED | OUTPATIENT
Start: 2022-02-21 | End: 2022-02-22 | Stop reason: HOSPADM

## 2022-02-21 RX ADMIN — OXCARBAZEPINE 300 MG: 300 TABLET, FILM COATED ORAL at 20:58

## 2022-02-21 RX ADMIN — GABAPENTIN 300 MG: 600 TABLET, FILM COATED ORAL at 20:59

## 2022-02-21 RX ADMIN — GABAPENTIN 300 MG: 600 TABLET, FILM COATED ORAL at 07:46

## 2022-02-21 RX ADMIN — DOCUSATE SODIUM 100 MG: 100 CAPSULE, LIQUID FILLED ORAL at 07:48

## 2022-02-21 RX ADMIN — PRAZOSIN HYDROCHLORIDE 1 MG: 1 CAPSULE ORAL at 20:59

## 2022-02-21 RX ADMIN — HYDROXYZINE HYDROCHLORIDE 25 MG: 25 TABLET, FILM COATED ORAL at 09:43

## 2022-02-21 RX ADMIN — PANTOPRAZOLE SODIUM 40 MG: 40 TABLET, DELAYED RELEASE ORAL at 06:18

## 2022-02-21 RX ADMIN — OXCARBAZEPINE 300 MG: 300 TABLET, FILM COATED ORAL at 10:40

## 2022-02-21 RX ADMIN — GABAPENTIN 300 MG: 600 TABLET, FILM COATED ORAL at 14:40

## 2022-02-21 NOTE — PLAN OF CARE
Problem: Discharge Planning:  Goal: Discharged to appropriate level of care  Description: Discharged to appropriate level of care  Outcome: Ongoing     Problem: Health Maintenance - Impaired:  Goal: Ability to perform activities of daily living will improve  Description: Ability to perform activities of daily living will improve  2/21/2022 1724 by Diego Wheeler RN  Outcome: Ongoing  2/21/2022 1618 by Marlene Dinh  Outcome: Ongoing  Goal: Able to sleep without medication for appropriate length of time  Description: Able to sleep without medication for appropriate length of time  Outcome: Ongoing  Goal: Maintenance of adequate nutrition will improve  Description: Maintenance of adequate nutrition will improve  Outcome: Ongoing     Problem: Mood - Altered:  Goal: Mood stable  Description: Mood stable  Outcome: Ongoing     Problem: Self-Esteem - Low:  Goal: Demonstrates positive self-esteem  Description: Demonstrates positive self-esteem  Outcome: Ongoing     Problem: Violence - Risk of, Self/Other-Directed:  Goal: Knowledge of developmental care interventions  Description: Absence of violence  Outcome: Ongoing     Problem: Suicide risk  Goal: Provide patient with safe environment  Description: Provide patient with safe environment  Outcome: Ongoing     Problem: Health Behavior:  Goal: Ability to verbalize adaptive coping strategies to use when suicidal feelings occur will improve  Description: Ability to verbalize adaptive coping strategies to use when suicidal feelings occur will improve  Outcome: Ongoing  Goal: Ability to verbalize adaptive coping strategies to use when the urge to self-mutilate occurs will improve  Description: Ability to verbalize adaptive coping strategies to use when the urge to self-mutilate occurs will improve  Outcome: Ongoing     Problem: Safety:  Goal: Ability to contract for his/her safety will improve  Description: Ability to contract for his/her safety will improve  Outcome: Ongoing     Problem: Pain:  Goal: Pain level will decrease  Description: Pain level will decrease  Outcome: Ongoing  Goal: Control of acute pain  Description: Control of acute pain  Outcome: Ongoing  Goal: Control of chronic pain  Description: Control of chronic pain  Outcome: Ongoing

## 2022-02-21 NOTE — PROGRESS NOTES
Treatment Team Note:     BELINDA met with [id number] team to discuss Pts TX and DC plans.      Progress/Behavior/Group Attendance: TBD     Target Symptoms/Reason for admission: Patient admitted to [facility] due to Patient had Klonopin in her home medications that was stored in a plastic tub. Counted meds contained in the bottle with Lavone Millan and sealed bottle in envelope and placed back in tub and tub was then placed, also containing her other home meds, back in the pharmacy cabinet  [address] Disorder, PTSD  Tobacco use disorder   UDS: Neg  [address]     Pt lives with: SW will meet with pt to gather information.     Collateral obtained from: SW will meet with pt to gather release of information.   On:     Family Session: STEPHEN     Misc:

## 2022-02-21 NOTE — PROGRESS NOTES
[facility] Daily Shift Assessment  Nursing Progress Note    Room: 0610/610-01 Name: Myriam Yi Age: 36 y.o. Gender: female   Dx: <principal problem not specified>  Precautions: suicide risk  Target Symptoms:   Accu-Chek: NoSleep: No,Sleep Quality Poor SI No AVH Denies   [facility] [place]  ADLs: No Speech: normal Depression: 8 Anxiety: 7   Participation LevelActive Listener and Interactive  Appetite: Good  Respiratory symptoms: No Headache: No Body aches: No Fever: No Cough: No  Patients encouraged to wear masks, wash hands frequently and practice social distancing while on the unit: Yes  Visitation: No   Participation QualityAppropriate and Attentive    Complaints:    Notes: patient upset that her gums bled when she brushed her teeth. Also complained that she is seeing black floaters in her eyes. Patient is social with other patients. Patient rates her depression as a 8 and her anxiety as a 7. Denies SI HI and AVH. Patient very upset that she heard news that her grandmother fell and she said her mother is now taking care of her. Patient very tearful during this exchange. Said that her sleep was poor as she was very restless and stopped breathing a few times in the night. Will continue to monitor for safety.     Signature:

## 2022-02-21 NOTE — PROGRESS NOTES
Group Therapy Note    Start Time: 800  End Time:  900  Number of Participants: 15    Type of Group: Community Meeting       Patient's Goal:  \"Talk to the Doc about my meds and talk to \"      Notes:        Participation Level:  Active Listener       Participation Quality: Appropriate      Thought Process/Content: Logical      Affective Functioning: Congruent      Mood: Calm      Level of consciousness:  Alert      Modes of Intervention: Support      Discipline Responsible: Behavioral Health Tech II      Signature:  Saray Grullon

## 2022-02-21 NOTE — PROGRESS NOTES
Group Note    Number of Participants in Group: 15  Number of Patients on Unit:15      Patient attended group:Yes  Reason for Absence:  Intervention for patient absence:        Type of Group:   Wrap-Up/Relaxation    Patient's Goal: See wrap up group sheet    Participation Level:     Active           Patient Response to Learning: Yes    Patient's Behavior: Cooperative    Is Patient Social/Interacting: Yes    Relaxation:   Television:Yes   Reading:Yes   Game/Puzzle:Yes   Phone: Yes       Notes/Comments:      Please see patient's wrap up group sheet for patient's comments       Electronically signed by Harriet Shirley on 2/20/22 at 9:27 PM CST

## 2022-02-21 NOTE — BH NOTE
Group Therapy Note    Date: 2/21/2022  Start Time: 1330  End Time:  1400  Number of Participants: 6    Type of Group: Spirituality    Wellness Binder Information  Module Name:  Mindfulness  Session Number:      Patient's Goal:  To keep the mind focus on the present    Notes:      Status After Intervention:  Improved    Participation Level:  Active Listener    Participation Quality: Appropriate, Attentive and Sharing      Speech:  normal      Thought Process/Content:       Affective Functioning: Congruent      Mood: euthymic, calm      Level of consciousness:  Attentive      Response to Learning: Able to verbalize current knowledge/experience and Capable of insight      Endings:     Modes of Intervention: Education, Support and Activity      Discipline Responsible:       Signature:  Tamela Bradley MA [facility]

## 2022-02-21 NOTE — PLAN OF CARE
Problem: Health Maintenance - Impaired:  Goal: Ability to perform activities of daily living will improve  Outcome: Ongoing     Group Therapy Note     Date: 2/21/2022  Start Time: 2722  End Time:  1600  Number of Participants: 5     Type of Group: Psychoeducation     Wellness Binder Information  Module Name:  staying well  Session Number:  1     Patient's Goal:  daily maintenance and coping skills     Notes:  pt acknowledged use of positive coping skills daily to help stay well.      Status After Intervention:  Improved     Participation Level: Interactive     Participation Quality: Appropriate, Attentive, and Sharing        Speech:  normal        Thought Process/Content: Logical        Affective Functioning: Congruent        Mood: congruent        Level of consciousness:  Alert, Oriented x4, and Attentive        Response to Learning: Able to verbalize current knowledge/experience        Endings: None Reported     Modes of Intervention: Education        Discipline Responsible: Psychoeducational Specialist        Signature:  Raúl Parada

## 2022-02-21 NOTE — PROGRESS NOTES
[address] [place] [address]      Psychiatric Progress Note    Name:  Shira Cardenas  Date:  2/21/2022  Age:  36 y.o. Sex:  female  Ethnicity:   Primary Care Physician:  Angela Rodriguez MD, MD   Patient Care Team:  Patient Care Team:  Angela Rodriguez MD as PCP - General (Family Medicine)  Chief Complaint: \" I am not going to take Lithium. \"        Historian:patient  Complaint Type: anxiety, decreased appetite, depression, irritability, loss of interest in favorite activities, mood swings and sleep disturbance  Course of Symptoms: improved  Precipitating Factors: history of mental illness       Subjective  Nursing notes were reviewed and patient had no behavioral issues during the night. As needed medications administered include Tylenol and Doxepin. Today she denies suicidal ideation, homicidal ideation and psychosis. Today she reports that she refused to take Lithium because she is concerned about possible side effects. She states, \"I have had urinary tract infections before and I don't want to take that medication. \" She was reassured and was again educated on side effects however continues to refuse Lithium. She has never taken Trileptal and prefers that medication instead. She has completed her ADL's this morning and she has been social with peers. She has been sitting out in the milieu with peers watching television. Patient reports sleep as \"it has been really good. \" Patient has been calm and cooperative with staff and peers. Patient has been attending groups. Patient reports appetite as \"it has been good. \" Patient reports no side effects from medications. Objective  Vitals:    02/21/22 0813   BP: (!) 117/59   Pulse: 81   Resp: 16   Temp: 97.2 °F (36.2 °C)   SpO2: 98%       Previous Psychiatric/Substance Use History      Medical History:  No past medical history on file.      HOLT History:   Social History     Substance and Sexual Activity   Alcohol Use Not on file         Social History Substance and Sexual Activity   Drug Use Not on file        Social History     Tobacco Use   Smoking Status Not on file   Smokeless Tobacco Not on file        Family History:     No family history on file. Mental Status:  Level of consciousness:  within normal limits and awake  Appearance:  well-appearing, street clothes, in chair, good grooming and good hygiene  Behavior/Motor:  no abnormalities noted  Attitude toward examiner:  cooperative, attentive and good eye contact  Speech:  normal rate and normal volume  Mood:  \" I am not going to take the Lithium. \"  Affect:  mood congruent  Thought processes:  linear and goal directed  Thought content:  Homocidal ideation : denies  Suicidal Ideation:  denies suicidal ideation  Delusions:  no evidence of delusions  Perceptual Disturbance:  denies any perceptual disturbance  Cognition:  oriented to person, place, and time  Concentration : good  Memory intact for recent and remote  Fund of knowledge:  average  Abstract thinking:  adquate  Insight: improved  Judgment:  good     OXcarbazepine  300 mg Oral BID    prazosin  1 mg Oral Nightly    gabapentin  300 mg Oral TID    docusate sodium  100 mg Oral Daily    nicotine  1 patch TransDERmal Daily    pantoprazole  40 mg Oral QAM AC       Current Medications:  Current Facility-Administered Medications   Medication Dose Route Frequency Provider Last Rate Last Admin    OXcarbazepine (TRILEPTAL) tablet 300 mg  300 mg Oral BID JOSE Gaffney        prazosin (MINIPRESS) capsule 1 mg  1 mg Oral Nightly Malik Peres MD   1 mg at 02/20/22 2205    clonazePAM (KLONOPIN) tablet 0.25 mg  0.25 mg Oral BID PRN Malik Peres MD        gabapentin (NEURONTIN) tablet 300 mg  300 mg Oral TID Malik Peres MD   300 mg at 02/21/22 0746    hydrOXYzine (ATARAX) tablet 25 mg  25 mg Oral TID PRN Malik Peres MD   25 mg at 02/21/22 0943    doxepin (SINEQUAN) capsule 25 mg  25 mg Oral Nightly PRN Shannon Kennedy Tatum APRN   25 mg at 02/20/22 2205    docusate sodium (COLACE) capsule 100 mg  100 mg Oral Daily JOSE Brown   100 mg at 02/21/22 0417    acetaminophen (TYLENOL) tablet 650 mg  650 mg Oral Q4H PRN Digna Looney MD   650 mg at 02/20/22 1823    polyethylene glycol (GLYCOLAX) packet 17 g  17 g Oral Daily PRN Digna Looney MD        asenapine maleate (SAPHRIS) sublingual tablet 2.5 mg  2.5 mg SubLINGual BID PRN Megha Arango APRN   2.5 mg at 02/18/22 1133    nicotine (NICODERM CQ) 7 MG/24HR 1 patch  1 patch TransDERmal Daily JOSE Brown   1 patch at 02/21/22 0745    pantoprazole (PROTONIX) tablet 40 mg  40 mg Oral QAM AC Christy Tatum, APRN   40 mg at 02/21/22 9801       Psychotherapy:   SUPPORTIVE    DSM V Diagnoses: Active Problems:    Borderline personality disorder ([facility]    Bipolar I disorder with mixed features ([facility]    Tobacco use disorder    PTSD (post-traumatic stress disorder)  Resolved Problems:    * No resolved hospital problems. *            Plan:    Encourage group therapy  15 minute safety checks  Medical monitoring by Dr. Yuko Zaragoza and associates  Continue current therapy and medications  Will dc [place]- pt reports that she will no longer take that medication because she is concerned about possible side effects  Will initiate Trileptal 300 mg po BID for mood stabilization-She was educated on side effects including; sedation, dizziness, headache, confusion, fatigue, nausea, vomiting, rash, vertigo, hyponatremia and rare activation of suicidal ideation. Amount of time spent with patient: 15 minutes with greater than 50% of the time spent in counseling and collaboration of care.     JOSE Brown  Clinician Signature: signed electronically

## 2022-02-21 NOTE — PROGRESS NOTES
[facility] Adult Unit Daily Assessment  Nursing Progress Note    Room: 0610/610-01   Name: Ryan Penny   Age: 36 y.o. Gender: female   Dx: <principal problem not specified>  Precautions: suicide risk  Inpatient Status: voluntary       SLEEP:    Sleep Quality Good  Sleep Medications: Yes   PRN Sleep Meds: Yes       MEDICAL:    Other PRN Meds: No   Med Compliant: Yes  Accu-Chek: Yes and No  Oxygen/CPAP/BiPAP: No  CIWA/CINA: No   PAIN Assessment: none  Side Effects from medication: No    Is Patient experiencing any respiratory symptoms (headache, fever, body aches, cough. [place]borne [place] ): no  Patient educated by nursing to practice social distancing, wear masks, wash hands frequently: yes      PSYCH:    Depression: 4   Anxiety: 5   SI denies suicidal ideation   HI Negative for homicidal ideation      AVH:Present -  \"Movement is wavy\"      GENERAL:    Appetite: good    Social: Yes   Speech: pressured and loud   Appearance: appropriately dressed and healthy looking    GROUP:    Group Participation: Yes  Participation Quality: Minimal    Notes:     Patient is cooperative, Alert and Oriented x4, appears Anxious. Patient Rates Depression a 4 and Anxiety a 5 on a 0-10 scale, with 10 being the worst. Patient affect is Congruent. Exhibited a Brightened facial expression; maintained good  eye contact throughout interview. Patient reports having current AVH. Patient states,\" I don't like the Saphris. I don't like how it made my tongue numb. I also don't want to SE with the lithium. I was wondering if I should switch to something different \". Instructed patient to speak to the psychiatrist in AM about questions and concerns about treatment plan. Patient is compliant with medications and group. No signs of distress noted; Patient observed socializing with peers after interview.      Electronically signed by Lamin Culver RN on 2/21/22 at 3:13 AM CST

## 2022-02-22 VITALS
OXYGEN SATURATION: 98 % | RESPIRATION RATE: 20 BRPM | HEIGHT: 67 IN | TEMPERATURE: 96.2 F | BODY MASS INDEX: 34.84 KG/M2 | HEART RATE: 90 BPM | DIASTOLIC BLOOD PRESSURE: 70 MMHG | SYSTOLIC BLOOD PRESSURE: 114 MMHG | WEIGHT: 222 LBS

## 2022-02-22 PROCEDURE — 6370000000 HC RX 637 (ALT 250 FOR IP): Performed by: PSYCHIATRY & NEUROLOGY

## 2022-02-22 PROCEDURE — 5130000000 HC BRIDGE APPOINTMENT

## 2022-02-22 PROCEDURE — 6370000000 HC RX 637 (ALT 250 FOR IP): Performed by: NURSE PRACTITIONER

## 2022-02-22 PROCEDURE — 99238 HOSP IP/OBS DSCHRG MGMT 30/<: CPT | Performed by: NURSE PRACTITIONER

## 2022-02-22 RX ORDER — HYDROXYZINE HYDROCHLORIDE 25 MG/1
25 TABLET, FILM COATED ORAL 3 TIMES DAILY PRN
Qty: 42 TABLET | Refills: 0 | Status: SHIPPED | OUTPATIENT
Start: 2022-02-22 | End: 2022-03-08

## 2022-02-22 RX ORDER — GABAPENTIN 600 MG/1
300 TABLET ORAL 3 TIMES DAILY
Qty: 21 TABLET | Refills: 0 | Status: SHIPPED | OUTPATIENT
Start: 2022-02-22 | End: 2022-02-22

## 2022-02-22 RX ORDER — TRAZODONE HYDROCHLORIDE 50 MG/1
50 TABLET ORAL ONCE
Status: COMPLETED | OUTPATIENT
Start: 2022-02-22 | End: 2022-02-22

## 2022-02-22 RX ORDER — GABAPENTIN 600 MG/1
300 TABLET ORAL 3 TIMES DAILY
Qty: 21 TABLET | Refills: 0 | Status: ON HOLD | OUTPATIENT
Start: 2022-02-22 | End: 2022-03-07 | Stop reason: HOSPADM

## 2022-02-22 RX ORDER — DOXEPIN HYDROCHLORIDE 25 MG/1
25 CAPSULE ORAL NIGHTLY PRN
Qty: 14 CAPSULE | Refills: 0 | Status: ON HOLD | OUTPATIENT
Start: 2022-02-22 | End: 2022-03-07 | Stop reason: HOSPADM

## 2022-02-22 RX ORDER — OXCARBAZEPINE 300 MG/1
300 TABLET, FILM COATED ORAL 2 TIMES DAILY
Qty: 28 TABLET | Refills: 0 | Status: ON HOLD | OUTPATIENT
Start: 2022-02-22 | End: 2022-03-07 | Stop reason: HOSPADM

## 2022-02-22 RX ADMIN — OXCARBAZEPINE 300 MG: 300 TABLET, FILM COATED ORAL at 07:59

## 2022-02-22 RX ADMIN — GABAPENTIN 300 MG: 600 TABLET, FILM COATED ORAL at 08:03

## 2022-02-22 RX ADMIN — TRAZODONE HYDROCHLORIDE 50 MG: 50 TABLET ORAL at 00:26

## 2022-02-22 RX ADMIN — CLONAZEPAM 0.25 MG: 0.5 TABLET ORAL at 08:03

## 2022-02-22 RX ADMIN — DOCUSATE SODIUM 100 MG: 100 CAPSULE, LIQUID FILLED ORAL at 08:00

## 2022-02-22 RX ADMIN — PANTOPRAZOLE SODIUM 40 MG: 40 TABLET, DELAYED RELEASE ORAL at 06:33

## 2022-02-22 RX ADMIN — ACETAMINOPHEN 650 MG: 325 TABLET ORAL at 10:40

## 2022-02-22 ASSESSMENT — PAIN SCALES - GENERAL: PAINLEVEL_OUTOF10: 5
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
## 2022-02-22 NOTE — PROGRESS NOTES
Progress Note  Gabriela Jacobsen  2/21/2022 10:47 PM  Subjective:   Admit Date:   2/17/2022      CC/ADMIT DX:       Interval History:   Reviewed overnight events and nursing notes. She denies any new physical complaints. I have reviewed all labs/diagnostics from the last 24hrs. ROS:   I have done a 10 point ROS and all are negative, except what is mentioned in the HPI. ADULT DIET; Regular    Medications:      OXcarbazepine  300 mg Oral BID    prazosin  1 mg Oral Nightly    gabapentin  300 mg Oral TID    docusate sodium  100 mg Oral Daily    nicotine  1 patch TransDERmal Daily    pantoprazole  40 mg Oral QAM AC           Objective:   Vitals: /71   Pulse 69   Temp 97.9 °F (36.6 °C)   Resp 16   Ht 5' 7\" (1.702 m)   Wt 222 lb (100.7 kg)   SpO2 100%   BMI 34.77 kg/m²  No intake or output data in the 24 hours ending 02/21/22 2247  General appearance: alert and cooperative with exam  Extremities: extremities normal, atraumatic, no cyanosis or edema  Neurologic:  No obvious focal neurologic deficits. Skin: no rashes    Assessment and Plan: Active Problems:    Borderline personality disorder ([address]    Bipolar I disorder with mixed features ([address]    Tobacco use disorder    PTSD (post-traumatic stress disorder)  Resolved Problems:    * No resolved hospital problems. *     Low Back Pain    Plan:  1. Continue present medication(s)   2. Follow with Psych  3. Supportive care for pain      Discharge planning:   her home     Reviewed treatment plans with the patient and/or family.              Electronically signed by Kamla Quintana MD on 2/21/2022 at 10:47 PM

## 2022-02-22 NOTE — PROGRESS NOTES
Group Therapy Note    Start Time: 900  End Time:  930  Number of Participants: 9    Type of Group: Community Meeting       Patient's Goal:        Notes:  Patient didn't write any goals    Participation Level:  Active Listener       Participation Quality: Appropriate      Thought Process/Content: Logical      Affective Functioning: Congruent      Mood: calm      Level of consciousness:  Alert      Modes of Intervention: Support      Discipline Responsible: Behavioral Health Tech II      Signature:  Andrez Delarosa

## 2022-02-22 NOTE — PLAN OF CARE
Problem: Discharge Planning:  Goal: Discharged to appropriate level of care  Description: Discharged to appropriate level of care  2/22/2022 0945 by Emily Cordon RN  Outcome: Completed     Group Therapy Note     Date: 2/22/2022  Start Time: 1000  End Time:  3039  Number of Participants: 7     Type of Group: Psychotherapy     Patient's Goal: Patient will process emotions and actions and how to relate to other or their with others. Patient discussed open communication and feelings and emotions. Notes:  Patient attended process group as scheduled, patient identified a issue to work on today regarding how they will interact and relate to others. Status After Intervention:  Improved     Participation Level:  Active Listener     Participation Quality: Appropriate, Attentive, and Sharing        Speech:  normal        Thought Process/Content: Logical        Affective Functioning: Congruent        Mood: euthymic        Level of consciousness:  Alert        Response to Learning: Able to verbalize current knowledge/experience        Endings: None Reported     Modes of Intervention: Education, Support, and Socialization        Discipline Responsible: /Counselor        Signature:  Asiya Chao [facility]

## 2022-02-22 NOTE — PROGRESS NOTES
CLINICAL PHARMACY NOTE: MEDS TO BEDS    Total # of Prescriptions Filled: 3   The following medications were delivered to the patient:  · Hydroxyzine 25mg  · Oxcarbazepine 300mg  · Doxepin 25mg    Additional Documentation: The medications were covered by the [facility] and picked up at the window by the nurse from 6th floor. The mission does not cover the gabapentin the floor was told to get Maggie East to hand write a script to go home with the patient to fill at her pharmacy.

## 2022-02-22 NOTE — PROGRESS NOTES
Treatment Team Note:     SW met with [address] team to discuss Pts TX and DC plans.      Progress/Behavior/Group Attendance: TBD     Target Symptoms/Reason for admission: Patient admitted to [facility] due to Patient had Klonopin in her home medications that was stored in a plastic tub.  Counted meds contained in the bottle with Velinda Haus and sealed bottle in envelope and placed back in tub and tub was then placed, also containing her other home meds, back in the pharmacy cabinet  [address] Disorder, PTSD  Tobacco use disorder   UDS: Neg  [address]     Pt lives with: mother     Collateral obtained from: mom  On:2/22/21     Family Session: TBA     Misc:

## 2022-02-22 NOTE — PROGRESS NOTES
[facility]  Discharge Note  Bridge Appointment completed: Reviewed Discharge Instructions with patient. Patient verbalizes understanding and agreement with the discharge plan using the teachback method. Referral for Outpatient Tobacco Cessation Counseling, upon discharge (serg X if applicable and completed):    ( )  Hospital staff assisted patient to call Quit Line or faxed referral                                   during hospitalization                  ( )  Recognizing danger situations (included triggers and roadblocks), if not completed on admission                    ( )  Coping skills (new ways to manage stress, exercise, relaxation techniques, changing routine, distraction), if not completed on admission                                                           ( )  Basic information about quitting (benefits of quitting, techniques in how to quit, available resources, if not completed on admission  ( ) Referral for counseling faxed to [facility]   (x ) Patient refused referral  ( x) Patient refused counseling  ( x) Patient refused smoking cessation medication upon discharge    Vaccinations (serg X if applicable and completed):  ( ) Patient states already received influenza vaccine elsewhere  ( ) Patient received influenza vaccine during this hospitalization  (x ) Patient refused influenza vaccine at this time      Pt discharged with followings belongings:       Valuables sent home with PATIENT. Valuables retrieved from [employer] and returned to patient. Patient left department with MOTHER AND LEFT   via CAR  , discharged to HOME  . Patient education on aftercare instructions: YES  Patient verbalize understanding of AVS:  YES. Suicidal Ideations? No AVH? DENIES HI?  Negative for homicidal ideation

## 2022-02-22 NOTE — PROGRESS NOTES
Collateral obtained from: patients clara Carrillo [phone]    Immediate Stressors & Time Episode Began: Patient has been battling with depression and anxiety and she went to the hospital in [place] and then she was sent to [facility] and was taken off of all of the medications. Patient went to see her counselor,  Patient told her cousin that she was planning to cut her wrist.  Patient has struggle with bipolar and has trouble sleeping. Patient gets very agitated. Patient doesn't have a therapist.  Patient [facility] Struggeled with Post pardinum depression in the past.      Diagnosis/Hx of compliance with meds: Previous diagnosis of Borderline personality disorder and Bipolar disorder and has been on medications in the past.    Tx Hx/Past hospitalizations:  [facility](10 years ago)    Family hx of psychiatric issues: Brother is bipolar and father and patient great grand father maternal father was diagnosed with Scizphenia    Substance Abuse: history of alcohol and marajuana    Pending Legal: shopping lifting in the past(10 years)    Safety Issues (Weapons? Hx of attempts): No weapons    Support system/Medication Managed by:  The importance of medication management and locking extra medication in a secured location was explained and reccommended to collateral.     Additional Info: Patient lives with her mother and her son(3years old)

## 2022-02-22 NOTE — PLAN OF CARE
Problem: Discharge Planning:  Goal: Discharged to appropriate level of care  2/22/2022 0945 by Ar Blunt RN  Outcome: Completed  2/22/2022 0504 by Matt Teran RN  Outcome: Ongoing     Problem: Health Maintenance - Impaired:  Goal: Ability to perform activities of daily living will improve  2/22/2022 0945 by Ar Blunt RN  Outcome: Completed  2/22/2022 0504 by Matt Teran RN  Outcome: Ongoing  Goal: Able to sleep without medication for appropriate length of time  2/22/2022 0945 by Ar Blunt RN  Outcome: Completed  2/22/2022 0504 by Matt Teran RN  Outcome: Ongoing  Goal: Maintenance of adequate nutrition will improve  2/22/2022 0945 by Ar Blunt RN  Outcome: Completed  2/22/2022 0504 by Matt Teran RN  Outcome: Ongoing     Problem: Mood - Altered:  Goal: Mood stable  2/22/2022 0945 by Ar Blunt RN  Outcome: Completed  2/22/2022 0504 by Matt Teran RN  Outcome: Ongoing     Problem: Self-Esteem - Low:  Goal: Demonstrates positive self-esteem  2/22/2022 0945 by Ar Blunt RN  Outcome: Completed  2/22/2022 0504 by Matt Teran RN  Outcome: Ongoing     Problem: Violence - Risk of, Self/Other-Directed:  Goal: Knowledge of developmental care interventions  2/22/2022 0945 by Ar Blunt RN  Outcome: Completed  2/22/2022 0504 by Matt Teran RN  Outcome: Ongoing     Problem: Suicide risk  Goal: Provide patient with safe environment  2/22/2022 0945 by Ar Blunt RN  Outcome: Completed  2/22/2022 0504 by Matt Teran RN  Outcome: Ongoing     Problem: Health Behavior:  Goal: Ability to verbalize adaptive coping strategies to use when suicidal feelings occur will improve  2/22/2022 0945 by Ar Blunt RN  Outcome: Completed  2/22/2022 0504 by Matt Teran RN  Outcome: Ongoing  Goal: Ability to verbalize adaptive coping strategies to use when the urge to self-mutilate occurs will improve  2/22/2022 0945 by Ar Blunt RN  Outcome: Completed  2/22/2022 0504 by Parth Kulkarni RN  Outcome: Ongoing     Problem: Safety:  Goal: Ability to contract for his/her safety will improve  2/22/2022 0945 by Baron Ty RN  Outcome: Completed  2/22/2022 0504 by Parth Kulkarni RN  Outcome: Ongoing     Problem: Pain:  Goal: Pain level will decrease  Outcome: Completed  Goal: Control of acute pain  Outcome: Completed  Goal: Control of chronic pain  Outcome: Completed

## 2022-02-23 NOTE — PROGRESS NOTES
Progress Note  Gabriela Jacobsen  2/22/2022 10:39 PM  Subjective:   Admit Date:   2/17/2022      CC/ADMIT DX:       Interval History:   Reviewed overnight events and nursing notes. She has no new medical issues. I have reviewed all labs/diagnostics from the last 24hrs. ROS:   I have done a 10 point ROS and all are negative, except what is mentioned in the HPI. No diet orders on file    Medications:             Objective:   Vitals: /70   Pulse 90   Temp 96.2 °F (35.7 °C) (Temporal)   Resp 20   Ht 5' 7\" (1.702 m)   Wt 222 lb (100.7 kg)   SpO2 98%   BMI 34.77 kg/m²  No intake or output data in the 24 hours ending 02/22/22 2239  General appearance: alert and cooperative with exam  Extremities: extremities normal, atraumatic, no cyanosis or edema  Neurologic:  No obvious focal neurologic deficits. Skin: no rashes    Assessment and Plan:   Principal Problem:    Bipolar I disorder with mixed features ([address]  Active Problems:    Borderline personality disorder (HCC)    Tobacco use disorder    PTSD (post-traumatic stress disorder)  Resolved Problems:    * No resolved hospital problems. *     Low Back Pain    Plan:  1. Continue present medication(s)   2. Follow with Psych  3. She is medically stable. I will monitor for any changes or concerns. Discharge planning:   her home     Reviewed treatment plans with the patient and/or family.              Electronically signed by Uri Yeh MD on 2/22/2022 at 10:39 PM

## 2022-02-23 NOTE — PROGRESS NOTES
Discharge Note     Pt discharging on this date. Pt denies SI, HI, and AVH at this time. Pt reports improvement in behavior and is leaving unit in overall good condition. SW and pt discussed pt's follow up appointments and importance of attending appointments as scheduled, pt voiced understanding and agreement. Pt and SW also discussed pt safety plan and pt able to verbally identify: warning signs, coping strategies, places and people that help make the pt feel better/distract negative thoughts, friends/family/agencies/professionals the pt can reach out to in a crisis, and something that is important to the pt/worth living for. Pt provided the national suicide prevention hotline number (1-588-261-866-342-3622) as well as local community behavioral health [facility]) crisis number for emergencies (2-783-326-242-544-0443). Pt to follow up with:  [facility] on 2/23/22 for medication management, patient will be seen on 02__/23__/22  for the med management appt.      Referral to out patient tobacco cessation counseling treatment:    Patient refused referral to outpatient tobacco cessation counseling    SW offered to assist pt with transportation, pt reports that she has transportation home

## 2022-03-02 ENCOUNTER — HOSPITAL ENCOUNTER (INPATIENT)
Age: 32
LOS: 5 days | Discharge: HOME | DRG: 885 | End: 2022-03-07
Attending: PSYCHIATRY & NEUROLOGY | Admitting: PSYCHIATRY & NEUROLOGY
Payer: MEDICAID

## 2022-03-02 PROBLEM — F12.20 CANNABIS USE DISORDER, MODERATE, DEPENDENCE (HCC): Status: ACTIVE | Noted: 2022-03-02

## 2022-03-02 PROBLEM — F32.A DEPRESSION: Status: RESOLVED | Noted: 2022-03-02 | Resolved: 2022-03-02

## 2022-03-02 PROBLEM — F32.A DEPRESSION: Status: ACTIVE | Noted: 2022-03-02

## 2022-03-02 PROCEDURE — 6370000000 HC RX 637 (ALT 250 FOR IP): Performed by: NURSE PRACTITIONER

## 2022-03-02 PROCEDURE — 99222 1ST HOSP IP/OBS MODERATE 55: CPT | Performed by: NURSE PRACTITIONER

## 2022-03-02 PROCEDURE — 6360000002 HC RX W HCPCS

## 2022-03-02 PROCEDURE — 1240000000 HC EMOTIONAL WELLNESS R&B

## 2022-03-02 PROCEDURE — 6370000000 HC RX 637 (ALT 250 FOR IP): Performed by: PSYCHIATRY & NEUROLOGY

## 2022-03-02 PROCEDURE — 6370000000 HC RX 637 (ALT 250 FOR IP): Performed by: FAMILY MEDICINE

## 2022-03-02 RX ORDER — POLYETHYLENE GLYCOL 3350 17 G/17G
17 POWDER, FOR SOLUTION ORAL DAILY PRN
Status: DISCONTINUED | OUTPATIENT
Start: 2022-03-02 | End: 2022-03-07 | Stop reason: HOSPADM

## 2022-03-02 RX ORDER — ASENAPINE 5 MG/1
5 TABLET SUBLINGUAL 2 TIMES DAILY
Status: DISCONTINUED | OUTPATIENT
Start: 2022-03-02 | End: 2022-03-04

## 2022-03-02 RX ORDER — ACETAMINOPHEN 325 MG/1
650 TABLET ORAL EVERY 4 HOURS PRN
Status: DISCONTINUED | OUTPATIENT
Start: 2022-03-02 | End: 2022-03-07 | Stop reason: HOSPADM

## 2022-03-02 RX ORDER — NICOTINE 21 MG/24HR
1 PATCH, TRANSDERMAL 24 HOURS TRANSDERMAL DAILY
Status: DISCONTINUED | OUTPATIENT
Start: 2022-03-02 | End: 2022-03-03

## 2022-03-02 RX ORDER — LORAZEPAM 2 MG/ML
2 INJECTION INTRAMUSCULAR EVERY 6 HOURS PRN
Status: DISCONTINUED | OUTPATIENT
Start: 2022-03-02 | End: 2022-03-07 | Stop reason: HOSPADM

## 2022-03-02 RX ORDER — DOXEPIN HYDROCHLORIDE 25 MG/1
25 CAPSULE ORAL NIGHTLY PRN
Status: DISCONTINUED | OUTPATIENT
Start: 2022-03-02 | End: 2022-03-04

## 2022-03-02 RX ORDER — HYDROXYZINE HYDROCHLORIDE 25 MG/1
25 TABLET, FILM COATED ORAL 3 TIMES DAILY PRN
Status: DISCONTINUED | OUTPATIENT
Start: 2022-03-02 | End: 2022-03-04

## 2022-03-02 RX ORDER — HALOPERIDOL 5 MG/ML
INJECTION INTRAMUSCULAR
Status: COMPLETED
Start: 2022-03-02 | End: 2022-03-02

## 2022-03-02 RX ORDER — ALBUTEROL SULFATE 90 UG/1
2 AEROSOL, METERED RESPIRATORY (INHALATION) EVERY 6 HOURS PRN
Status: DISCONTINUED | OUTPATIENT
Start: 2022-03-02 | End: 2022-03-07 | Stop reason: HOSPADM

## 2022-03-02 RX ORDER — OXCARBAZEPINE 300 MG/1
300 TABLET, FILM COATED ORAL 2 TIMES DAILY
Status: DISCONTINUED | OUTPATIENT
Start: 2022-03-02 | End: 2022-03-04

## 2022-03-02 RX ORDER — HALOPERIDOL 5 MG/ML
5 INJECTION INTRAMUSCULAR EVERY 6 HOURS PRN
Status: DISCONTINUED | OUTPATIENT
Start: 2022-03-02 | End: 2022-03-07 | Stop reason: HOSPADM

## 2022-03-02 RX ORDER — LORAZEPAM 2 MG/ML
INJECTION INTRAMUSCULAR
Status: COMPLETED
Start: 2022-03-02 | End: 2022-03-02

## 2022-03-02 RX ADMIN — LORAZEPAM 2 MG: 2 INJECTION INTRAMUSCULAR; INTRAVENOUS at 07:31

## 2022-03-02 RX ADMIN — ALBUTEROL SULFATE 2 PUFF: 90 AEROSOL, METERED RESPIRATORY (INHALATION) at 18:27

## 2022-03-02 RX ADMIN — HALOPERIDOL LACTATE 5 MG: 5 INJECTION, SOLUTION INTRAMUSCULAR at 07:32

## 2022-03-02 RX ADMIN — ASENAPINE MALEATE 5 MG: 5 TABLET SUBLINGUAL at 20:09

## 2022-03-02 RX ADMIN — ASENAPINE MALEATE 5 MG: 5 TABLET SUBLINGUAL at 10:59

## 2022-03-02 RX ADMIN — OXCARBAZEPINE 300 MG: 300 TABLET, FILM COATED ORAL at 10:59

## 2022-03-02 RX ADMIN — OXCARBAZEPINE 300 MG: 300 TABLET, FILM COATED ORAL at 20:08

## 2022-03-02 RX ADMIN — ACETAMINOPHEN 650 MG: 325 TABLET ORAL at 20:18

## 2022-03-02 ASSESSMENT — SLEEP AND FATIGUE QUESTIONNAIRES
RESTFUL SLEEP: NO
DIFFICULTY STAYING ASLEEP: YES
DO YOU USE A SLEEP AID: YES
DIFFICULTY ARISING: NO
DO YOU HAVE DIFFICULTY SLEEPING: YES
DIFFICULTY FALLING ASLEEP: YES

## 2022-03-02 ASSESSMENT — PATIENT HEALTH QUESTIONNAIRE - PHQ9: SUM OF ALL RESPONSES TO PHQ QUESTIONS 1-9: 27

## 2022-03-02 ASSESSMENT — PAIN DESCRIPTION - DESCRIPTORS
DESCRIPTORS: ACHING
DESCRIPTORS: BURNING;DISCOMFORT

## 2022-03-02 ASSESSMENT — PAIN DESCRIPTION - ORIENTATION
ORIENTATION: LEFT
ORIENTATION: LOWER

## 2022-03-02 ASSESSMENT — PAIN SCALES - GENERAL
PAINLEVEL_OUTOF10: 5
PAINLEVEL_OUTOF10: 7
PAINLEVEL_OUTOF10: 5

## 2022-03-02 ASSESSMENT — PAIN DESCRIPTION - PROGRESSION
CLINICAL_PROGRESSION: NOT CHANGED
CLINICAL_PROGRESSION: NOT CHANGED

## 2022-03-02 ASSESSMENT — PAIN DESCRIPTION - ONSET: ONSET: GRADUAL

## 2022-03-02 ASSESSMENT — LIFESTYLE VARIABLES: HISTORY_ALCOHOL_USE: NO

## 2022-03-02 ASSESSMENT — PAIN DESCRIPTION - LOCATION
LOCATION: WRIST
LOCATION: BACK;HEAD

## 2022-03-02 ASSESSMENT — PAIN DESCRIPTION - FREQUENCY
FREQUENCY: INTERMITTENT
FREQUENCY: INTERMITTENT

## 2022-03-02 ASSESSMENT — PAIN DESCRIPTION - PAIN TYPE
TYPE: CHRONIC PAIN
TYPE: ACUTE PAIN

## 2022-03-02 NOTE — PLAN OF CARE
Group Therapy Note     Date: 3/2/2022  Start Time: 1100  End Time:  1130  Number of Participants: 13     Type of Group: Psychoeducation     Wellness Binder Information  Module Name:  emotional wellness  Session Number:  1     Patient's Goal:  validation of feelings     Notes:  pt was verbally prompted to attend group. Pt refused. Information about emotional wellness was provided. Status After Intervention:       Participation Level:      Participation Quality:         Speech:           Thought Process/Content:         Affective Functioning:         Mood:         Level of consciousness:          Response to Learning:         Endings:      Modes of Intervention:         Discipline Responsible: Psychoeducational Specialist        Signature:  Prosper Watson
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
## 2022-03-02 NOTE — PROGRESS NOTES
Treatment Team Note:    SW met with [address] team to discuss Pts [address] plans. Progress/Behavior/Group Attendance: TBD    Target Symptoms/Reason for admission: Patient admitted to [facility] due to Direct Admit from Bala Rodriguez from [facility]. Patient came to Ed for suicidal ideations. Pt was brought by EMS and patient found on floor by EMS with patient stating \" I just want to end it\" and with left hand superficial laceration from her cutting herself. Pt was tearful and sobbing severely, pt has been seen in the ER a couple of time in past couple of days. Also patient stated that she signed over custody of her son to her mother. pt health issues of Bipolar, Depression and anxiety. pt states that has psuedo seizures and hit her head and has head pain. Diagnoses: Bipolar I disorder with mixed features , Borderline Personality Disorder  Chronic PTSD, Cannabis use disorder, Tobacco use disorder   UDS: Neg  BAL:  Neg    AftercarePlan: [address] lives with: SW will meet with pt to gather information. Collateral obtained from: SW will meet with pt to gather release of information.   On:    Family Session: STEPHEN    Misc:

## 2022-03-02 NOTE — PLAN OF CARE
Problem: Pain:  Goal: Pain level will decrease  Description: Pain level will decrease  Outcome: Ongoing  Goal: Control of acute pain  Description: Control of acute pain  Outcome: Ongoing  Goal: Control of chronic pain  Description: Control of chronic pain  Outcome: Ongoing     Problem: Depressive Behavior With or Without Suicide Precautions:  Goal: Able to verbalize acceptance of life and situations over which he or she has no control  Description: Able to verbalize acceptance of life and situations over which he or she has no control  Outcome: Ongoing  Goal: Able to verbalize and/or display a decrease in depressive symptoms  Description: Able to verbalize and/or display a decrease in depressive symptoms  Outcome: Ongoing  Goal: Ability to disclose and discuss suicidal ideas will improve  Description: Ability to disclose and discuss suicidal ideas will improve  Outcome: Ongoing  Goal: Able to verbalize support systems  Description: Able to verbalize support systems  Outcome: Ongoing  Goal: Absence of self-harm  Description: Absence of self-harm  Outcome: Ongoing  Goal: Patient specific goal  Description: Patient specific goal  Outcome: Ongoing  Goal: Participates in care planning  Description: Participates in care planning  Outcome: Ongoing     Problem: Risk of Harm:  Goal: Ability to remain free from injury will improve  Description: Ability to remain free from injury will improve  Outcome: Ongoing     Problem: Suicide risk  Goal: Provide patient with safe environment  Description: Provide patient with safe environment  Outcome: Ongoing     Problem: Falls - Risk of:  Goal: Will remain free from falls  Description: Will remain free from falls  Outcome: Ongoing  Goal: Absence of physical injury  Description: Absence of physical injury  Outcome: Ongoing

## 2022-03-02 NOTE — PLAN OF CARE
Group Therapy Note     Date: 3/2/2022  Start Time: 3800  End Time:  1600  Number of Participants: 7     Type of Group: Recovery     Wellness Binder Information  Module Name:  relapse prevention  Session Number:  2     Patient's Goal:  identifying early warning signs     Notes:  pt was verbally prompted to attend group. Pt refused. Information about preventing relapse was provided. Status After Intervention:       Participation Level:      Participation Quality:         Speech:           Thought Process/Content:         Affective Functioning:         Mood:         Level of consciousness:          Response to Learning:         Endings:      Modes of Intervention:         Discipline Responsible: Psychoeducational Specialist        Signature:  Deangelo Steele

## 2022-03-02 NOTE — PROGRESS NOTES
Patients mother called and stated that patient has had a severe reaction to her second covid vaccine and is wanting to know if we will test her to see if she has shingles on her brain that could be causing her mental issues. Mother would also like the doctor to call her reguarding the patient plan.

## 2022-03-02 NOTE — PROGRESS NOTES
[facility] Adult Unit Daily Assessment  Nursing Progress Note    Room: [facility]/601-01   Name: Cedric Roberts   Age: 36 y.o. Gender: female   Dx: Depression  Precautions: suicide risk  Inpatient Status: voluntary       SLEEP:    Sleep Quality Fair  Sleep Medications: No   PRN Sleep Meds: No       MEDICAL:    Other PRN Meds: Yes   Med Compliant: Yes  Accu-Chek: No  Oxygen/CPAP/BiPAP: No  CIWA/CINA: No   PAIN Assessment: none  Side Effects from medication: No    Is Patient experiencing any respiratory symptoms (headache, fever, body aches, cough. Tootie Kid ): no  Patient educated by nursing to practice social distancing, wear masks, wash hands frequently: yes      PSYCH:    Depression: 10   Anxiety: 10   SI active, without plan and contracts with staff for safety   HI Negative for homicidal ideation      AVH:Absent      GENERAL:    Appetite: decreased    Social: No   Speech: pressured   Appearance: appropriately dressed, appropriately groomed and healthy looking    GROUP:    Group Participation: No  Participation Quality: None    Notes:     Patient is alert, oriented, irritable and very emotional. Patient is isolating to her room but does come out for the phone, medications and meals. Patient is interviewed sitting on the bed in her room. Patient becomes very loud at times during interview and is crying and yelling. Patient has pretty good eye contact during interview and at times has a constricted and unstable affect. Patient becomes exaggerated/ elevated at times during interview. Patient can be suspicious during interview talking about hearing her mother and son talking about her instead of to her. Patient reports that she sees isaacs of electricity and shadow people. Patient reports that she hears voices of people she knows and others that she does not know being negative toward her. Patient also reports that she has been seeing things since she had the reaction to her 2nd covid vaccine.  Patient reports that she urinated the other day and saw a parasite coming out of her and that when she popped a pimple on her face a bug came out of it. Patient reports that she is seeing bugs coming out of her. Patient has superficial cuts to her left wrist that the bandage was removed and is now open to air. patient reports positive for suicidal thoughts without a plan and contracts for safety with staff. Patient with no obvious s/s of distress voiced or noted. Will conitnue to monitor.        Electronically signed by Danielle Dao RN on 3/2/22 at 11:21 AM CST

## 2022-03-02 NOTE — PROGRESS NOTES
Group Therapy Note    Start Time: 900  End Time:  930  Number of Participants: 18    Type of Group: Community Meeting       Patient's Goal:  \"try and cope with thing without losing my shit again\"      Notes:      Participation Level:  Active Listener       Participation Quality: Appropriate      Thought Process/Content: Logical      Affective Functioning: Congruent      Mood: calm      Level of consciousness:  Alert      Modes of Intervention: Support      Discipline Responsible: Behavioral Health Tech II      Signature:  Emil Bravo

## 2022-03-02 NOTE — PROGRESS NOTES
SW met with pt to complete psychosocial/CSSRS evaluation. Pt extremely emotional and unable to complete assessment questions. Unable to assess.

## 2022-03-02 NOTE — PROGRESS NOTES
Admission Note      Reason for admission/Target Symptom: Patient admitted to [facility] due to Direct Admit from [address] from [facility]. Patient came to Ed for suicidal ideations. Pt was brought by EMS and patient found on floor by EMS with patient stating \" I just want to end it\" and with left hand superficial laceration from her cutting herself. Pt was tearful and sobbing severely, pt has been seen in the ER a couple of time in past couple of days. Also patient stated that she signed over custody of her son to her mother. pt health issues of Bipolar, Depression and anxiety. pt states that has psuedo seizures and hit her head and has head pain. Diagnoses: Depression NOS  UDS: Neg  BAL:  Neg    SW met with treatment team to discuss patient's treatment including care planning, discharge planning, and follow-up needs. Pt has been admitted to [facility]. Treatment team has identified patient's discharge needs as medication management and outpatient therapy/counseling. Pt confirmed  the need for ongoing treatment post inpatient stay. Pt was also provided a handout of contact information for drug and alcohol treatment centers and other community support service such as [facility], AA, and [facility].

## 2022-03-02 NOTE — H&P
Behavioral Services  Medicare Certification Upon Admission    I certify that this patient's inpatient psychiatric hospital admission is medically necessary for:    [x] (1) Treatment which could reasonably be expected to improve this patient's condition,       [] (2) Or for diagnostic study;     AND     [x](2) The inpatient psychiatric services are provided while the individual is under the care of a physician and are included in the individualized plan of care.     Estimated length of stay/service 3 days-5 weeks    Plan for post-hospital care :TBA    Electronically signed by JOSE Salgado on 3/2/2022 at 10:40 AM

## 2022-03-02 NOTE — BH NOTE
[facility] Admission from Transfer facility  Nursing Admission Note        Reason for Admission: Odette Hankins is 36year old Direct Admit from [place], [place] from [facility]. Patient came to Ed for suicidal ideations. Pt was brought by EMS and patient found on floor by EMS with patient stating \" I just want to end it\" and with left hand superficial laceration from her cutting herself. Pt was tearful and sobbing severely, pt has been seen in the ER a couple of time in past couple of days. Also patient stated that she signed over custody of her son to her mother. pt health issues of Bipolar, Depression and anxiety. pt states that has psuedo seizures and hit her head and has head pain. Patient Active Problem List   Diagnosis    Borderline personality disorder ([address]    Bipolar I disorder with mixed features ([address]    Tobacco use disorder    PTSD (post-traumatic stress disorder)    Depression         Addictive Behavior:   Addictive Behavior  In the past 3 months, have you felt or has someone told you that you have a problem with:  : None  Do you have a history of Chemical Use?: No  Do you have a history of Alcohol Use?: No  Do you have a history of Street Drug Abuse?: Yes  Histroy of Prescripton Drug Abuse?: Yes    Medical Problems:   No past medical history on file.     Status EXAM:  Status and Exam  Normal: No  Facial Expression: Avoids Gaze,Sad,Flat,Worried  Affect: Constricted  Level of Consciousness: Alert  Mood:Normal: No  Mood: Depressed,Anxious,Labile,Sad  Motor Activity:Normal: No  Motor Activity: Decreased  Interview Behavior: Cooperative  Preception: [place] to Person,[place] to Time,[place] to Place,[place] to Situation  Attention:Normal: No  Attention: Distractible,Unable to Concentrate  Thought Processes: Circumstantial  Thought Content:Normal: No  Thought Content: Preoccupations  Hallucinations: None  Delusions: No  Delusions: Persecution  Memory:Normal: Yes  Memory: Poor Recent,Poor Remote  Insight and Judgment: No  Insight and Judgment: Poor Judgment,Poor Insight  Present Suicidal Ideation: Yes  Present Homicidal Ideation: No      Metabolic Screening:    No results found for: LABA1C  No results found for: CHOL  No results found for: TRIG  No results found for: HDL  No components found for: LDLCAL  No results found for: LABVLDL    Body mass index is 34.17 kg/m². BP Readings from Last 2 Encounters:   03/02/22 115/62   02/22/22 114/70       PATIENT STRENGTHS:  Strengths: Social Skills,Medication Compliance    Patient Strengths and Limitations:  Limitations: Lacks leisure interests,Unrealistic self-view,General negative or hopeless attitude about future/recovery      Tobacco Screening:  Practical Counseling, on admission, serg X, if applicable and completed (first 3 are required if patient doesn't refuse):            Recognizing danger situations (included triggers and roadblocks)   **yes*              Coping skills (new ways to manage stress, exercise, relaxation techniques, changing routine, distraction  no                                                   Basic information about quitting (benefits of quitting, techniques in how to quit, available resources yes  [facility]     refused                                      Patient refused counseling yes  Patient has not smoked in the last 30 days yes  Patient offered nicotine patch. Received yes  Refused no  Patient is a non-smoker no         Admission to Unit:    Pt admitted to [facility] under the care of Dr. Humphrey Crigler,  arrived on unit via [facility] with security and staff from EMS from [facility], from [facility]    Patient arrived dressed in paper scrubs:  no.    Body assessment and safety check completed by yes and  no contraband discovered. Patient belongings and valuables was cataloged and accounted for by Moni Philip Bankofpoker ion.      Admission completed by Branden Bella RN  Oriented to unit, unit policy and expectations: yes    Reviewed and explained all legal documents:  yes    Education for Fall Prevention and Restraints given: yes    Patient signed all legal documents yes   Pt verbalizes understanding:yes     Gisela Wells? yes    Identifies stressors. yes   Family and current life. COVID TEACHING: Nursing provided education regarding COVID for social distancing, wearing masks, washing hands, and reporting any symptoms: no  Mask Provided: yes If patient refused, reason: no      Admission Note:    Pt admitted to unit from Direct admit from [facility]. At [facility], pt dressed in gown, removed and pt redressed in paper scurbs, and also with IV, IID in right forarm and removed by nurse, ankita intact, pt had no complaint of pain, pt had left wrist bandaged from superficial cutting to wrist. Pt stated that both arms are numb. Pt answered questions and signed legal documents and then stated that she was drowzy from the medications administered at prior facility. Pt stated that she has SI ideations \"why do you think I did what I did\" She was admin. Ativan 1 mg, Pazosin 1 mg for nightmares and Toradol 30 mg IM 2 times for head pain pt states from falls from psuedo seizures. Pt calm and went to bed. Pt is resting.     Electronically signed by Oniel Gibson RN on 3/2/22 at 6:15 AM CST

## 2022-03-03 PROCEDURE — 1240000000 HC EMOTIONAL WELLNESS R&B

## 2022-03-03 PROCEDURE — 99231 SBSQ HOSP IP/OBS SF/LOW 25: CPT | Performed by: NURSE PRACTITIONER

## 2022-03-03 PROCEDURE — 6370000000 HC RX 637 (ALT 250 FOR IP): Performed by: NURSE PRACTITIONER

## 2022-03-03 PROCEDURE — 95816 EEG AWAKE AND DROWSY: CPT | Performed by: PSYCHIATRY & NEUROLOGY

## 2022-03-03 PROCEDURE — 6370000000 HC RX 637 (ALT 250 FOR IP): Performed by: PSYCHIATRY & NEUROLOGY

## 2022-03-03 PROCEDURE — 95816 EEG AWAKE AND DROWSY: CPT

## 2022-03-03 PROCEDURE — 99222 1ST HOSP IP/OBS MODERATE 55: CPT | Performed by: PSYCHIATRY & NEUROLOGY

## 2022-03-03 RX ORDER — DIPHENHYDRAMINE HCL 25 MG
25 TABLET ORAL EVERY 6 HOURS PRN
Status: DISCONTINUED | OUTPATIENT
Start: 2022-03-03 | End: 2022-03-07 | Stop reason: HOSPADM

## 2022-03-03 RX ORDER — CALCIUM CARBONATE 200(500)MG
500 TABLET,CHEWABLE ORAL 3 TIMES DAILY PRN
Status: DISCONTINUED | OUTPATIENT
Start: 2022-03-03 | End: 2022-03-07 | Stop reason: HOSPADM

## 2022-03-03 RX ADMIN — ACETAMINOPHEN 650 MG: 325 TABLET ORAL at 19:45

## 2022-03-03 RX ADMIN — ASENAPINE MALEATE 5 MG: 5 TABLET SUBLINGUAL at 07:57

## 2022-03-03 RX ADMIN — HYDROXYZINE HYDROCHLORIDE 25 MG: 25 TABLET, FILM COATED ORAL at 16:39

## 2022-03-03 RX ADMIN — ACETAMINOPHEN 650 MG: 325 TABLET ORAL at 15:49

## 2022-03-03 RX ADMIN — OXCARBAZEPINE 300 MG: 300 TABLET, FILM COATED ORAL at 07:57

## 2022-03-03 RX ADMIN — ASENAPINE MALEATE 5 MG: 5 TABLET SUBLINGUAL at 20:39

## 2022-03-03 RX ADMIN — ANTACID TABLETS 500 MG: 500 TABLET, CHEWABLE ORAL at 20:38

## 2022-03-03 RX ADMIN — DOXEPIN HYDROCHLORIDE 25 MG: 25 CAPSULE ORAL at 20:55

## 2022-03-03 ASSESSMENT — PAIN DESCRIPTION - DESCRIPTORS: DESCRIPTORS: ACHING

## 2022-03-03 ASSESSMENT — PAIN DESCRIPTION - FREQUENCY: FREQUENCY: CONTINUOUS

## 2022-03-03 ASSESSMENT — PAIN SCALES - GENERAL
PAINLEVEL_OUTOF10: 5
PAINLEVEL_OUTOF10: 3

## 2022-03-03 ASSESSMENT — PAIN DESCRIPTION - ORIENTATION: ORIENTATION: LOWER

## 2022-03-03 ASSESSMENT — PAIN DESCRIPTION - LOCATION: LOCATION: TEETH

## 2022-03-03 ASSESSMENT — PAIN DESCRIPTION - PROGRESSION: CLINICAL_PROGRESSION: GRADUALLY WORSENING

## 2022-03-03 ASSESSMENT — PAIN DESCRIPTION - ONSET: ONSET: GRADUAL

## 2022-03-03 ASSESSMENT — PAIN DESCRIPTION - PAIN TYPE: TYPE: ACUTE PAIN

## 2022-03-03 NOTE — PROGRESS NOTES
[address] [place] [address]      Psychiatric Progress Note    Name:  Bruno Kincaid  Date:  3/3/2022  Age:  36 y.o. Sex:  female  Ethnicity:   Primary Care Physician:  Shan Kwong MD, MD   Patient Care Team:  Patient Care Team:  Shan Kwong MD as PCP - General (Family Medicine)  Chief Complaint: \" I am scared something is really bad wrong with me. \"        Historian:patient  Complaint Type: anxiety, behavior problems, decreased appetite, depression, fatigue, irritability, loss of interest in favorite activities, mood swings, sleep disturbance and tobacco use  Course of Symptoms: ongoing  Precipitating Factors: history of mental illness     Subjective  Nursing notes were reviewed and patient was irritable during the night. As needed medications administered include Tylenol. Today she endorses suicidal ideations with thoughts to cut her wrists. She is able to contract for safety. Today she denies homicidal ideation. Endorses hearing \"loud voices\" however feels like it is people outside of her room talking. Today she reports that she is scared that her seizures have \"messed up her brain. \" She reports that she was told she was having seizures after the birth of her first child however has never had an EEG. She reports that during a event she loses consciousness, begins convulsing and has incontinence. She reports the events have been happening more frequently since she had COVID-19 last year. Endorses having poor short term memory as well. At this time she is emotionally unstable and is crying loudly asking for someone to \"please help her. \" She takes her right hand and hits her head and states, \"Something is messed up in here. \" She remains isolative to her room. Reports vomiting this morning after eating breakfast as well. Patient reports sleep as \"I slept fine I guess. \" Patient has been calm and cooperative with staff and peers. Patient has been compliant with medications.  Patient has been attending few groups. Patient reports appetite as \"it's fine. \" Patient reports no side effects from medications. Today she rates both depression and anxiety as 10 on a 0-10 scale. Objective  Vitals:    03/03/22 0924   BP: (!) 104/55   Pulse: 84   Resp: 20   Temp: 98.7 °F (37.1 °C)   SpO2: 98%       Previous Psychiatric/Substance Use History      Medical History:  No past medical history on file. HOLT History:   Social History     Substance and Sexual Activity   Alcohol Use Not on file         Social History     Substance and Sexual Activity   Drug Use Not on file        Social History     Tobacco Use   Smoking Status Not on file   Smokeless Tobacco Not on file        Family History:     No family history on file. Mental Status:  Level of consciousness:  within normal limits and awake  Appearance:  street clothes, seated in bed, fair grooming and fair hygiene  Behavior/Motor:  no abnormalities noted  Attitude toward examiner:  cooperative, attentive and good eye contact  Speech:  loud  Mood:  \"I don't know anything anymore. \"  Affect:  intense  Thought processes:  rapid  Thought content:  Homocidal ideation :denies  Suicidal Ideation:  active  Delusions:  no evidence of delusions  Perceptual Disturbance:  auditory  Cognition:  oriented to person, place, and time  Concentration : fair  Memory intact for recent and remote  Fund of knowledge:  average  Abstract thinking:  adequate  Insight: poor  Judgment:  poor     nicotine  1 patch TransDERmal Daily    OXcarbazepine  300 mg Oral BID    asenapine maleate  5 mg SubLINGual BID       Current Medications:  Current Facility-Administered Medications   Medication Dose Route Frequency Provider Last Rate Last Admin    acetaminophen (TYLENOL) tablet 650 mg  650 mg Oral Q4H PRN Jovan Rand MD   650 mg at 03/02/22 2018    polyethylene glycol (GLYCOLAX) packet 17 g  17 g Oral Daily PRN Jovan Rand MD        hydrOXYzine (ATARAX) tablet 25 mg  25 mg Oral TID PRN Yasmine Navarro MD        nicotine (NICODERM CQ) 14 MG/24HR 1 patch  1 patch TransDERmal Daily Yasmine Navarro MD   1 patch at 03/02/22 0813    haloperidol lactate (HALDOL) injection 5 mg  5 mg IntraMUSCular Q6H PRN Steve Camejo MD   5 mg at 03/02/22 0732    LORazepam (ATIVAN) injection 2 mg  2 mg IntraMUSCular Q6H PRN Steve Camejo MD   2 mg at 03/02/22 0731    doxepin (SINEQUAN) capsule 25 mg  25 mg Oral Nightly PRN JOSE Ray        OXcarbazepine (TRILEPTAL) tablet 300 mg  300 mg Oral BID JOSE Ray   300 mg at 03/03/22 0757    asenapine maleate (SAPHRIS) sublingual tablet 5 mg  5 mg SubLINGual BID JOSE Ray   5 mg at 03/03/22 0757    albuterol sulfate  (90 Base) MCG/ACT inhaler 2 puff  2 puff Inhalation Q6H PRN Nakul Roberts MD   2 puff at 03/02/22 1827       Psychotherapy:   SUPPORTIVE    DSM V Diagnoses:    Principal Problem:    Bipolar I disorder with mixed features ([address]  Active Problems:    Borderline personality disorder ([address]    PTSD (post-traumatic stress disorder)    Tobacco use disorder    Cannabis use disorder, moderate, dependence ([address]  Resolved Problems:    Depression            Plan:    Encourage group therapy  15 minute safety checks  Medical monitoring by Dr. Remi Jaquez and associates  Continue current therapy and medications  Consult Neurology- pt reporting having \"possible seizures- loses consciousness, convulses, has incontinence, reports short term memory loss after an \"event\"     Amount of time spent with patient:  15 minutes with greater than 50% of the time spent in counseling and collaboration of care.     JOSE Ray  Clinician Signature: signed electronically

## 2022-03-03 NOTE — PLAN OF CARE
Problem: Pain:  Goal: Pain level will decrease  Outcome: Ongoing  Goal: Control of acute pain  Outcome: Ongoing  Goal: Control of chronic pain  Outcome: Ongoing     Problem: Depressive Behavior With or Without Suicide Precautions:  Goal: Able to verbalize acceptance of life and situations over which he or she has no control  Outcome: Ongoing  Goal: Able to verbalize and/or display a decrease in depressive symptoms  Outcome: Ongoing  Goal: Ability to disclose and discuss suicidal ideas will improve  Outcome: Ongoing  Goal: Able to verbalize support systems  Outcome: Ongoing  Goal: Absence of self-harm  Outcome: Ongoing  Goal: Patient specific goal  Outcome: Ongoing  Goal: Participates in care planning  Outcome: Ongoing     Problem: Risk of Harm:  Goal: Ability to remain free from injury will improve  Outcome: Ongoing     Problem: Suicide risk  Goal: Provide patient with safe environment  Outcome: Ongoing     Problem: Falls - Risk of:  Goal: Will remain free from falls  Outcome: Ongoing  Goal: Absence of physical injury  Outcome: Ongoing

## 2022-03-03 NOTE — PROGRESS NOTES
Treatment Team Note:     SW met with [place] team to discuss Pts [place] and DC plans.      Progress/Behavior/Group Attendance: TBD     Target Symptoms/Reason for admission: Patient admitted to [facility] due to Direct Admit from [place], [address] [place] [address] from [facility]. Patient came to Ed for suicidal ideations. Pt was brought by EMS and patient found on floor by EMS with patient stating \" I just want to end it\" and with left hand superficial laceration from her cutting herself. Pt was tearful and sobbing severely, pt has been seen in the ER a couple of time in past couple of days. Also patient stated that she signed over custody of her son to her mother. pt health issues of Bipolar, Depression and anxiety. pt states that has psuedo seizures and hit her head and has head pain.   Diagnoses: Bipolar I disorder with mixed features , Borderline Personality Disorder  Chronic PTSD, Cannabis use disorder, Tobacco use disorder   UDS: Neg  BAL:  Neg     Aftercare[address]: [address]     Pt lives with: mother     Collateral obtained from: refused  On:     Family Session: STEPHEN     Misc:

## 2022-03-03 NOTE — CONSULTS
[facility] Neurology Consult      Patient:   Kimberly Christensen  MR#:    [id number]  Account Number:                   [id number]      Room:    [facility]/601-01   YOB: 1990  Date of Progress Note: 3/3/2022  Time of Note                           2:20 PM  Attending Physician:  Eryn Santos MD  Consulting Physician:  Justice Garcia DO       CHIEF COMPLAINT:  Possible seizure    HISTORY OF PRESENT ILLNESS:   This is a 36 y.o. female who was admitted to our [facility] with anxiety, depression, SI. She notes a possible seizure history. Though has been evaluated and told in the past that she has pseudoseizures. She has significant psychiatric co-morbidities including borderline personality disorder, bipolar disorder, depression and PTSD. No overt TBI history. No history of meningitis or encephalitis. No family history of seizures. On trileptal, for mood stabilization. REVIEW OF SYSTEMS:  Constitutional - No fever or chills. HENT -  No Scalp tenderness. No tinnitus or significant hearing loss. No nose bleeding, no sore throat. Eyes - No sudden vision change or eye pain  Respiratory - No significant shortness of breath or cough  Cardiovascular - No chest pain. No palpitations or significant leg swelling  Gastrointestinal - No abdominal swelling or pain. Genitourinary - No difficulty urinating, dysuria  Musculoskeletal - No back pain or myalgia. Skin - No color change or rash  Neurologic - No lateralizing weakness. No numbness. Hematologic - No easy bruising or spontaneous bleeding. Psychiatric - Anxiety. PAST MEDICAL HISTORY:  No past medical history on file. PAST SURGICAL HISTORY:  No past surgical history on file. SOCIAL HISTORY:   TOBACCO:   has no history on file for tobacco use. ETOH:   has no history on file for alcohol use. DRUG:    Social History     Substance and Sexual Activity   Drug Use Not on file       FAMILY HISTORY:   No family history on file.     MEDICATIONS: Current Facility-Administered Medications:     nicotine (NICODERM CQ) 7 MG/24HR 1 patch, 1 patch, TransDERmal, Daily, Frankey Burr, MD, 1 patch at 03/03/22 1411    acetaminophen (TYLENOL) tablet 650 mg, 650 mg, Oral, Q4H PRN, Frankey Burr, MD, 650 mg at 03/02/22 2018    polyethylene glycol (GLYCOLAX) packet 17 g, 17 g, Oral, Daily PRN, Frankey Burr, MD    hydrOXYzine (ATARAX) tablet 25 mg, 25 mg, Oral, TID PRN, Frankey Burr, MD    haloperidol lactate (HALDOL) injection 5 mg, 5 mg, IntraMUSCular, Q6H PRN, Ranjana Liz MD, 5 mg at 03/02/22 0732    LORazepam (ATIVAN) injection 2 mg, 2 mg, IntraMUSCular, Q6H PRN, Ranjana Liz MD, 2 mg at 03/02/22 0731    doxepin (SINEQUAN) capsule 25 mg, 25 mg, Oral, Nightly PRN, Gilberto Sanchez APRN    OXcarbazepine (TRILEPTAL) tablet 300 mg, 300 mg, Oral, BID, Gilberto Sanchez APRN, 300 mg at 03/03/22 0757    asenapine maleate (SAPHRIS) sublingual tablet 5 mg, 5 mg, SubLINGual, BID, Gilberto Sanchez, APRN, 5 mg at 03/03/22 0757    albuterol sulfate  (90 Base) MCG/ACT inhaler 2 puff, 2 puff, Inhalation, Q6H PRN, Juaquin Burt MD, 2 puff at 03/02/22 1827    ALLERGIES:    Latex and Morphine    PHYSICAL EXAM:    Constitutional -   BP (!) 104/55   Pulse 84   Temp 98.7 °F (37.1 °C) (Temporal)   Resp 20   Ht 5' 7\" (1.702 m)   Wt 218 lb 3.2 oz (99 kg)   SpO2 98%   BMI 34.17 kg/m²   General appearance: No acute distress   EYES -   Conjunctiva normal  Pupillary exam as below, see CN exam in the neurologic exam  ENT-    No scars, masses, or lesions over external nose or ears  Oropharynx without erythema, palate midline  Cardiovascular -   No clubbing, cyanosis, or edema   Pulmonary-   Good expansion, normal effort without use of accessory muscles  Musculoskeletal -   No significant wasting of muscles noted  Gait as below, see gait exam in the neurologic exam  Muscle strength, tone, stability as below see the motor exam in the neurologic exam. No bony deformities  Skin -   Warm, dry, and intact to inspection and palpation. No rash, erythema, or pallor  Psychiatric -   Mood, affect, and behavior appear normal    Memory as below see mental status examination in the neurologic exam      NEUROLOGICAL EXAM    Mental status   [x] Awake, alert, oriented   [x] Affect attention and concentration appear appropriate  [x] Recent and remote memory appears unremarkable  [x] Speech normal without dysarthria or aphasia, comprehension and repetition intact. COMMENTS:   Cranial Nerves [x] No VF deficit to confrontation,  optic discs normal, no papilledema on fundoscopic exam.  [x] PERRLA, EOMI, no nystagmus, conjugate eye movements, no ptosis  [x] Face symmetric  [x] Facial sensation intact  [x] Tongue midline no atrophy or fasciculations present  [x] Palate midline, hearing to finger rub normal  [x] Shoulder shrug and SCM testing normal  COMMENTS:   Motor   [x] 5/5 strength x 4 extremities  [x] Normal bulk and tone  [x] No tremor present  [x] No rigidity or bradykinesia noted  COMMENTS:   Sensory  [x] Sensation intact to light touch, pin prick, vibration, and proprioception BLE  [] Sensation intact to light touch, pin prick, vibration, and proprioception BUE  COMMENTS:   Coordination [x] FTN normal bilaterally   [] HTS normal bilaterally  [] JEFE normal.   COMMENTS:   Reflexes  [x] Symmetric and non-pathological  [x] Toes downgoing bilaterally  [x] No clonus present  COMMENTS:   Gait                  [x] Normal steady gait    [] Ataxic    [] Spastic     [] Magnetic     [] Shuffling  [] Not assessed  COMMENTS:       LABS/IMAGING:    As below and per HPI      ASSESSMENT:  36 y.o. admitted with worsening depression, SI. History of BPD, bipolar disorder, and PTSD. Seizure like episodes noted. 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 PLAN:  1. EEG  2. Continue trileptal, taking for mood stabilization. Do not feel strongly about adding other AEDs.    3.  MRI as outpatient, recent head CT negative. 4.  Seizure precautions, prn ativan    Please feel free to call with any questions. [phone] (cell phone).     Garret Rasheed DO  Board Certified Neurology

## 2022-03-03 NOTE — PROGRESS NOTES
[facility] Adult Unit Daily Assessment  Nursing Progress Note    Room: [facility]/601-01   Name: Ting Heredia   Age: 36 y.o. Gender: female   Dx: Bipolar I disorder with mixed features ([address]  Precautions: suicide risk  Inpatient Status: voluntary       SLEEP:    Sleep Quality Fair  Sleep Medications: No   PRN Sleep Meds: No       MEDICAL:    Other PRN Meds: Yes   Med Compliant: Yes  Accu-Chek: No  Oxygen/CPAP/BiPAP: No  CIWA/CINA: No   PAIN Assessment:  Yes, level 5 for Left Wrist laceration  Side Effects from medication: No    Is Patient experiencing any respiratory symptoms (headache, fever, body aches, cough. [place] ): no  Patient educated by nursing to practice social distancing, wear masks, wash hands frequently: no      PSYCH:    Depression: 10   Anxiety: 10   SI active and without plan   HI Negative for homicidal ideation      AVH:Present - Auditory      GENERAL:    Appetite: no change from normal    Social: No   Speech: normal   Appearance: appropriately dressed and healthy looking    GROUP:    Group Participation: Yes  Participation Quality: Interactive    Notes:     Patient up to nurse's station shortly after shift change. She is irritable and tearful and requesting to know what bedtime medications she has tonight, specifically for sleep. Informed patient that doxepin was ordered and available. She became more elevated, and reports \"That puts me in a coma, I don't want that, I need trazadone! \"    Instructed patient she did have the right to refuse the medication, but she would need to speak with the physician in the morning regarding medication changes. She is agreeable to this. Instructed patient that if she has difficulty sleeping to let nursing staff know, and we would attempt to obtain another medication form physician on call. When interviewing patient, she replies \"yes\" to being suicidal, but has no specific plan right now. She does contract for safety.   She reports she knows her [de-identified] year old is \"pissed\" at her for being in here right now. She does endorse auditory hallucinations at this time. She reports the auditory hallucinations are voices telling her she is worthless and doesn't deserve to live. She denies homicidal ideation at this time. She has remained isolative to her room and isn't social.  She has completed group wrap. She is cooperative with staff.       Electronically signed by Charles Chicas LPN on 6/9/89 at 7:47 PM CST

## 2022-03-03 NOTE — PROGRESS NOTES
Group Note    Number of Participants in Group: 11  Number of Patients on Unit:13      Patient attended group:Yes  Reason for Absence:  Intervention for patient absence:        Type of Group:   Wrap-Up/Relaxation    Patient's Goal: See wrap up group sheet    Participation Level:    active           Patient Response to Learning: Yes    Patient's Behavior: Cooperative    Is Patient Social/Interacting: Yes    Relaxation:   Television:Yes   Reading:Yes   Game/Puzzle:No   Phone: Yes       Notes/Comments:      Please see patient's wrap up group sheet for patient's comments       Electronically signed by Sondra High on 3/3/22 at 12:57 AM CST

## 2022-03-03 NOTE — PROGRESS NOTES
BHI Daily Shift Assessment  Nursing Progress Note    Room: [facility]/601-01 Name: Harleen Chowdhury Age: 36 y.o. Gender: female   Dx: Bipolar I disorder with mixed features ([address]  Precautions: suicide risk  Target Symptoms:   Accu-Chek: NoSleep: No,Sleep Quality Fair SI Yes-contracts for safety [address]  ADLs: Yes Speech: normal Depression: 10 Anxiety: 10   Participation LevelActive Listener  Appetite: fair  Respiratory symptoms: No Headache: No Body aches: No Fever: No Cough: No  Patients encouraged to wear masks, wash hands frequently and practice social distancing while on the unit: Yes  Visitation: No   Participation QualityIntrusive      Notes: Patient is alert and oriented x 4. Compliant with medications. Appearance is clean and appropriate. Well groomed. Has been isolated to room most of day, unstable, crying loudly intermittently. When asked what's wrong she wouldn't answer. Mag Button that she had a seizure earlier but was unwitnessed. Mumbling her speech and needing assistance to bathroom due to weakness, patient says because of her seizure. Slight tremors observed during this time. Patient now is calm in day area socializing with peers. Appetite is fair. Reports fair sleep. Labile, thought processes are rapid. Insight and judgement is poor. Dr hicks came to see at 56 for probable seizures but patient was in shower. Ordering EEG and said would come assess her later.     Signature: Electronically signed by Dakota Tierney RN on 3/3/22 at 1:45 PM CST

## 2022-03-03 NOTE — PROGRESS NOTES
Mother called requesting a blood test due to patient being exposed   to shingles and or scabies couple months ago and says this is when the patients behavioral issues and symptoms started to escalate. She also said patient had to a severe reaction to chicken pox when she was little and had to be flown to [facility]. Mother is concerned that this could be what is causing her behavior.

## 2022-03-03 NOTE — PROGRESS NOTES
Group Therapy Note    Start Time: 900  End Time:  116  Number of Participants: 12    Type of Group: Community Meeting       Patient's Goal:        Notes:  Patient didn't write any goals    Participation Level:  Active Listener       Participation Quality: Appropriate      Thought Process/Content: Logical      Affective Functioning: Congruent      Mood: calm      Level of consciousness:  Alert      Modes of Intervention: Support      Discipline Responsible: Behavioral Health Tech II      Signature:  Karel Mcginnis

## 2022-03-03 NOTE — H&P
HISTORY and PHYSICAL      CHIEF COMPLAINT:  Depression, SI    Reason for Admission:  Depression, SI    History Obtained From:  Patient, chart    HISTORY OF PRESENT ILLNESS:      The patient is a 36 y.o. female who is admitted to the [place] [address] unit with worsening mood issues. She has no physical complaints. No CP or SOA. No abdominal pain or N/V. No dysuria. No new pain issues. No fevers. She denies any HA or dizziness. Past Medical History:    No past medical history on file. Past Surgical History:    No past surgical history on file. Medications Prior to Admission:    Medications Prior to Admission: doxepin (SINEQUAN) 25 MG capsule, Take 1 capsule by mouth nightly as needed (sleep)  hydrOXYzine (ATARAX) 25 MG tablet, Take 1 tablet by mouth 3 times daily as needed for Itching or Anxiety  OXcarbazepine (TRILEPTAL) 300 MG tablet, Take 1 tablet by mouth 2 times daily for 14 days  gabapentin (NEURONTIN) 600 MG tablet, Take 0.5 tablets by mouth 3 times daily for 14 days. Allergies:  Latex and Morphine    Social History:   TOBACCO:   has no history on file for tobacco use. ETOH:   has no history on file for alcohol use. DRUGS:   has no history on file for drug use. MARITAL STATUS:  single  OCCUPATION:  Not working  Patient currently lives with family       Family History:   No family history on file.   REVIEW OF SYSTEMS:  Constitutional: neg  CV: neg  Pulmonary: neg  GI: neg  : neg  Psych: depression, SI  Neuro: neg  Skin: neg  MusculoSkeletal: neg  HEENT: neg  Joints: neg    Vitals:  /81   Pulse 94   Temp 98.2 °F (36.8 °C)   Resp 16   Ht 5' 7\" (1.702 m)   Wt 218 lb 3.2 oz (99 kg)   SpO2 98%   BMI 34.17 kg/m²     PHYSICAL EXAM:  Gen: NAD, alert, in bed  HEENT: WNL  Lymph: no LAD  Neck: no JVD or masses  Chest: CTA bilat  CV: RRR  Abdomen: NT/ND  Extrem: no C/C/E  Neuro: non focal  Skin: no rashes  Joints: no redness    DATA:  I have reviewed the admission labs and imaging tests.     ASSESSMENT AND PLAN:      Active Problems:    Depression, SI---follow with Eugenia Watkins MD  10:49 PM 3/2/2022

## 2022-03-04 PROCEDURE — 86702 HIV-2 ANTIBODY: CPT

## 2022-03-04 PROCEDURE — 6370000000 HC RX 637 (ALT 250 FOR IP): Performed by: NURSE PRACTITIONER

## 2022-03-04 PROCEDURE — 86701 HIV-1ANTIBODY: CPT

## 2022-03-04 PROCEDURE — 6370000000 HC RX 637 (ALT 250 FOR IP): Performed by: PSYCHIATRY & NEUROLOGY

## 2022-03-04 PROCEDURE — 99231 SBSQ HOSP IP/OBS SF/LOW 25: CPT | Performed by: NURSE PRACTITIONER

## 2022-03-04 PROCEDURE — 99232 SBSQ HOSP IP/OBS MODERATE 35: CPT | Performed by: PSYCHIATRY & NEUROLOGY

## 2022-03-04 PROCEDURE — 1240000000 HC EMOTIONAL WELLNESS R&B

## 2022-03-04 PROCEDURE — 6370000000 HC RX 637 (ALT 250 FOR IP): Performed by: FAMILY MEDICINE

## 2022-03-04 PROCEDURE — 6360000002 HC RX W HCPCS: Performed by: PSYCHIATRY & NEUROLOGY

## 2022-03-04 RX ORDER — FLUCONAZOLE 150 MG/1
150 TABLET ORAL ONCE
Status: COMPLETED | OUTPATIENT
Start: 2022-03-04 | End: 2022-03-04

## 2022-03-04 RX ORDER — PANTOPRAZOLE SODIUM 40 MG/1
40 TABLET, DELAYED RELEASE ORAL
Status: DISCONTINUED | OUTPATIENT
Start: 2022-03-04 | End: 2022-03-07 | Stop reason: HOSPADM

## 2022-03-04 RX ORDER — PRAZOSIN HYDROCHLORIDE 1 MG/1
1 CAPSULE ORAL NIGHTLY
Status: DISCONTINUED | OUTPATIENT
Start: 2022-03-04 | End: 2022-03-07 | Stop reason: HOSPADM

## 2022-03-04 RX ORDER — HYDROXYZINE HYDROCHLORIDE 25 MG/1
50 TABLET, FILM COATED ORAL 3 TIMES DAILY PRN
Status: DISCONTINUED | OUTPATIENT
Start: 2022-03-04 | End: 2022-03-07 | Stop reason: HOSPADM

## 2022-03-04 RX ORDER — ONDANSETRON 4 MG/1
4 TABLET, ORALLY DISINTEGRATING ORAL EVERY 8 HOURS PRN
Status: DISCONTINUED | OUTPATIENT
Start: 2022-03-04 | End: 2022-03-07 | Stop reason: HOSPADM

## 2022-03-04 RX ORDER — HYDROXYZINE HYDROCHLORIDE 25 MG/1
25 TABLET, FILM COATED ORAL ONCE
Status: COMPLETED | OUTPATIENT
Start: 2022-03-04 | End: 2022-03-04

## 2022-03-04 RX ORDER — TRAZODONE HYDROCHLORIDE 50 MG/1
50 TABLET ORAL NIGHTLY
Status: DISCONTINUED | OUTPATIENT
Start: 2022-03-04 | End: 2022-03-04

## 2022-03-04 RX ORDER — TRAZODONE HYDROCHLORIDE 50 MG/1
50 TABLET ORAL NIGHTLY PRN
Status: DISCONTINUED | OUTPATIENT
Start: 2022-03-04 | End: 2022-03-07 | Stop reason: HOSPADM

## 2022-03-04 RX ORDER — LITHIUM CARBONATE 300 MG/1
300 CAPSULE ORAL
Status: DISCONTINUED | OUTPATIENT
Start: 2022-03-04 | End: 2022-03-05

## 2022-03-04 RX ADMIN — ANTACID TABLETS 500 MG: 500 TABLET, CHEWABLE ORAL at 08:06

## 2022-03-04 RX ADMIN — HYDROXYZINE HYDROCHLORIDE 50 MG: 25 TABLET, FILM COATED ORAL at 16:59

## 2022-03-04 RX ADMIN — PANTOPRAZOLE SODIUM 40 MG: 40 TABLET, DELAYED RELEASE ORAL at 09:35

## 2022-03-04 RX ADMIN — DIPHENHYDRAMINE HYDROCHLORIDE 25 MG: 25 TABLET ORAL at 22:36

## 2022-03-04 RX ADMIN — HALOPERIDOL LACTATE 5 MG: 5 INJECTION, SOLUTION INTRAMUSCULAR at 00:00

## 2022-03-04 RX ADMIN — HYDROXYZINE HYDROCHLORIDE 25 MG: 25 TABLET, FILM COATED ORAL at 14:34

## 2022-03-04 RX ADMIN — POLYETHYLENE GLYCOL 3350 17 G: 17 POWDER, FOR SOLUTION ORAL at 17:03

## 2022-03-04 RX ADMIN — TRAZODONE HYDROCHLORIDE 50 MG: 50 TABLET ORAL at 21:02

## 2022-03-04 RX ADMIN — LORAZEPAM 2 MG: 2 INJECTION INTRAMUSCULAR; INTRAVENOUS at 00:00

## 2022-03-04 RX ADMIN — LITHIUM CARBONATE 300 MG: 300 CAPSULE, GELATIN COATED ORAL at 17:00

## 2022-03-04 RX ADMIN — LITHIUM CARBONATE 300 MG: 300 CAPSULE, GELATIN COATED ORAL at 11:54

## 2022-03-04 RX ADMIN — ONDANSETRON 4 MG: 4 TABLET, ORALLY DISINTEGRATING ORAL at 10:48

## 2022-03-04 RX ADMIN — PRAZOSIN HYDROCHLORIDE 1 MG: 1 CAPSULE ORAL at 20:57

## 2022-03-04 RX ADMIN — HYDROXYZINE HYDROCHLORIDE 25 MG: 25 TABLET, FILM COATED ORAL at 08:29

## 2022-03-04 RX ADMIN — ANTACID TABLETS 500 MG: 500 TABLET, CHEWABLE ORAL at 15:49

## 2022-03-04 RX ADMIN — FLUCONAZOLE 150 MG: 150 TABLET ORAL at 11:54

## 2022-03-04 RX ADMIN — ACETAMINOPHEN 650 MG: 325 TABLET ORAL at 13:57

## 2022-03-04 ASSESSMENT — PAIN DESCRIPTION - LOCATION: LOCATION: TEETH

## 2022-03-04 ASSESSMENT — PAIN DESCRIPTION - PROGRESSION: CLINICAL_PROGRESSION: GRADUALLY WORSENING

## 2022-03-04 ASSESSMENT — PAIN DESCRIPTION - PAIN TYPE: TYPE: ACUTE PAIN

## 2022-03-04 ASSESSMENT — PAIN - FUNCTIONAL ASSESSMENT: PAIN_FUNCTIONAL_ASSESSMENT: ACTIVITIES ARE NOT PREVENTED

## 2022-03-04 ASSESSMENT — PAIN DESCRIPTION - ORIENTATION: ORIENTATION: UPPER

## 2022-03-04 ASSESSMENT — PAIN DESCRIPTION - ONSET: ONSET: GRADUAL

## 2022-03-04 ASSESSMENT — PAIN SCALES - GENERAL
PAINLEVEL_OUTOF10: 9
PAINLEVEL_OUTOF10: 10

## 2022-03-04 ASSESSMENT — PAIN DESCRIPTION - DESCRIPTORS: DESCRIPTORS: ACHING

## 2022-03-04 ASSESSMENT — PAIN DESCRIPTION - FREQUENCY: FREQUENCY: INTERMITTENT

## 2022-03-04 NOTE — PROGRESS NOTES
Treatment Team Note:     BELINDA met with [address] team to discuss Pts [address] plans.      Progress/Behavior/Group Attendance: TBD     Target Symptoms/Reason for admission: Patient admitted to [facility] due [facility] from [place], [place] from [facility]. Patient came to [facility] for suicidal ideations. Pt was brought by EMS and patient found on floor by EMS with patient stating \" I just want to end it\" and with left hand superficial laceration from her cutting herself. Pt was tearful and sobbing severely, pt has been seen in the ER a couple of time in past couple of days. Also patient stated that she signed over custody of her son to her mother. pt health issues of Bipolar, Depression and anxiety. pt states that has psuedo seizures and hit her head and has head pain.   Diagnoses: Bipolar I disorder with mixed features , Borderline Personality Disorder  Chronic PTSD, Cannabis use disorder, Tobacco use disorder   UDS: Neg  BAL:  Neg     [address]     Pt lives with: mother     Collateral obtained from: refused  On:     Family Session: STEPHEN     Misc:

## 2022-03-04 NOTE — PROCEDURES
ADULT INPATIENT ELECTROENCEPHALOGRAM REPORT    Patient:   Roya Abreu  MR#:    [id number]  Room #:    INPATIENT  YOB: 1990  Date of Evaluation:  3/3/2022  Primary Physician:     Clay Cartagena MD, MD   Referring Physician:   Soco Guzmán DO      CLINICAL INFORMATION:     This patient is a 36 y.o. female with a history of possible seizures. MEDICATIONS:     See MAR. RECORDING CONDITIONS:     This EEG was performed utilizing standard International 10-20 System of electrode placement, with additional channels monitored for eye movement. One channel electrocardiogram was monitored. Data was obtained, stored, and interpreted according to ACNS guidelines (J Clin Neurophysiol 2006;23(2):) utilizing referential montage recording, with reformatting to longitudinal, transverse bipolar, and referential montages as necessary for interpretation, along with the digital/automated EEG analysis. Patient tolerated entire procedure well. Photic stimulation and hyperventilation were utilized as activation procedures unless otherwise specified below. E.E.G. DESCRIPTION:     The resting predominant posterior background frequency is a 9-10 Hz 30-40 uV rhythm. No overt focal, lateralizing, or paroxysmal abnormalities were noted through the recording. Drowsiness and sleep were not demonstrated. Hyperventilation was not performed. Photic stimulation was performed and had little change on the recording. Muscle, motion, and eye movement artifacts were noted. EEG INTERPRETATION:    Normal EEG for age in the awake state. CLINICAL CORRELATION:     The absence of epileptiform abnormalities does not preclude a clinical diagnosis of seizures.        Soco Guzmán DO  Board Certified Neurologist    Date reported: 3/3/2022  Date signed: 3/3/2022

## 2022-03-04 NOTE — PROGRESS NOTES
Patient approached nurse's station reporting \"I can't sleep\"  Patient expelling several burps at this time and reports \"I have really bad GERD\"   Patient then reports \"I am gonna pass out! \"  Patient held onto the counter and assisted self to the floor. This nurse came to assist patient who was laying on the floor on her left side and she was alert and began to cry loudly. I asked the patient if she was hurt and she yelled \"I have really bad GERD! \"  Patient still burping several times. Patient c/o nausea and reports \"I shouldn't have ate those peanuts\" Rapid response called. Vital signs obtained and are WNL. No obvious signs of injury noted, but patient voices that she struck the back of her head. She reports no change in vision. Upon physical inspection there is no abrasion, swelling, or injury noted  to that area. PERRL. She continues to cry and is assisted to a  wheelchair as rapid response team arrives. Patient assisted to bed. EKG obtained and is NSR HR 92. Color is good, she is non diaphoretic, and afebrile. No deficits noted at this time. Hospitaltist at bedside has cleared patient from a physical standpoint at this time. Patient extremely is anxious and agitated. PRN medication is available and administered to patient per order. Patient tolerated injection well. .Fall precaution orders placed and initiated. Meryle Sandman

## 2022-03-04 NOTE — PROGRESS NOTES
comprehension and repetition intact. COMMENTS:   Cranial Nerves [x] No VF deficit to confrontation  [x] PERRLA, EOMI, no nystagmus, conjugate eye movements, no ptosis  [x] Face symmetric  [x] Facial sensation intact  [x] Tongue midline no atrophy or fasciculations present  [x] Palate midline, hearing to finger rub normal  [x] Shoulder shrug and SCM testing normal  COMMENTS:   Motor   [x] 5/5 strength x 4 extremities  [x] Normal bulk and tone  [x] No tremor present  [x] No rigidity or bradykinesia noted  COMMENTS:   Sensory  [x] Sensation intact to light touch, pin prick, vibration, and proprioception BLE  [] Sensation intact to light touch, pin prick, vibration, and proprioception BUE  COMMENTS:   Coordination [x] FTN normal bilaterally   [] HTS normal bilaterally  [] JEFE normal.   COMMENTS:   Reflexes  [x] Symmetric and non-pathological  [x] Toes downgoing bilaterally  [x] No clonus present  COMMENTS:   Gait                  [x] Normal steady gait    [] Ataxic    [] Spastic     [] Magnetic     [] Shuffling  [] Not assessed  COMMENTS:       LABS/IMAGING:    Lab Results   Component Value Date    WBC 7.7 02/17/2022    HGB 13.3 02/17/2022    HCT 43.1 02/17/2022    .1 (H) 02/17/2022     02/17/2022     Lab Results   Component Value Date     02/17/2022    K 4.3 02/17/2022     02/17/2022    CO2 27 02/17/2022    BUN 10 02/17/2022    CREATININE 0.7 02/17/2022    GLUCOSE 107 02/17/2022    CALCIUM 9.7 02/17/2022    PROT 6.6 02/17/2022    LABALBU 4.7 02/17/2022    BILITOT <0.2 02/17/2022    ALKPHOS 78 02/17/2022    AST 14 02/17/2022    ALT 14 02/17/2022    LABGLOM >60 02/17/2022    GFRAA >59 02/17/2022     RECORD REVIEW:   Previous medical records, medications were reviewed at today's visit. Nursing/physician notes, imaging, labs and vitals reviewed. PT,OT and/or speech notes reviewed         ASSESSMENT:  36 y.o. admitted with worsening depression, SI. History of BPD, bipolar disorder, and PTSD. Seizure like episodes noted. 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 EEG normal.  Exam stable.       PLAN:  1. Do not feel strongly about adding AEDs. 2.  MRI as outpatient, recent head CT negative. 3.  Still recommended event precautions. No driving, heights, swimming, tub baths, open flames, or heavy machinery. Will sign off, call if needed. Please feel free to call with any questions. [phone] (cell phone).       Thiago Ziegler DO  Board Certified Neurology

## 2022-03-04 NOTE — PLAN OF CARE
Group Therapy Note     Date: 3/4/2022  Start Time: 1000  End Time:  1030  Number of Participants: 4     Type of Group: Psychoeducation     Wellness Binder Information  Module Name:  staying well  Session Number:  1     Patient's Goal:  daily maintenance coping skills     Notes:  pt was verbally prompted to attend group. Pt refused. Information about coping skills was provided. Status After Intervention:       Participation Level:      Participation Quality:         Speech:           Thought Process/Content:         Affective Functioning:         Mood:         Level of consciousness:          Response to Learning:         Endings:      Modes of Intervention:         Discipline Responsible: Psychoeducational Specialist        Signature:  Marlene Dinh

## 2022-03-04 NOTE — PROGRESS NOTES
[facility] Adult Unit Daily Assessment  Nursing Progress Note    Room: [facility]/601-01   Name: Berny Bennett   Age: 36 y.o. Gender: female   Dx: Bipolar I disorder with mixed features ([address]  Precautions: suicide risk  Inpatient Status: voluntary       SLEEP:    Sleep Quality poor  Sleep Medications: No   PRN Sleep Meds: yes sinequan      MEDICAL:    Other PRN Meds: Yes   Med Compliant: Yes  Accu-Chek: No  Oxygen/CPAP/BiPAP: No  CIWA/CINA: No   PAIN Assessment: none  Side Effects from medication: No    Is Patient experiencing any respiratory symptoms (headache, fever, body aches, cough. Alessandro Brittle ): no  Patient educated by nursing to practice social distancing, wear masks, wash hands frequently: yes      PSYCH:    Depression: 10   Anxiety: 10   SI denies  HI Negative for homicidal ideation      AVH:Absent      GENERAL:    Appetite: decreased    Social: yes  Speech: normal  Appearance: appropriately dressed, appropriately groomed and healthy looking    GROUP:    Group Participation: yes  Participation Quality: active listener    Notes:     Patient is alert, oriented, calm and irritable during interview. Patient has been out in the day area today socializing with peers more. patient complaining about having a bad night and not sleeping last night. Patient reports increase in darlin to arm an on chest now related to medications. patient complaining of increase in GERD, MD notified and orders recieved. patient is not happy with her treatment and care she is recieving here. Staff listened theraputically and reassured patient. Patient with no obvious s/s of distress noted. Will conitnue to monitor.        Electronically signed by Rosie Napoles RN on 3/4/22 at 9:51 AM CST

## 2022-03-04 NOTE — PLAN OF CARE
Problem: Depressive Behavior With or Without Suicide Precautions:  Goal: Able to verbalize acceptance of life and situations over which he or she has no control  Description: Able to verbalize acceptance of life and situations over which he or she has no control  3/4/2022 1158 by Elli Webb LPC  Outcome: Ongoing                                                                       Group Therapy Note    Date: 3/4/2022  Start Time: 1000  End Time:  1045  Number of Participants: 4    Type of Group: Psychotherapy    Patient's Goal: Patient will process emotions and actions and how to relate to other or their with others. Patient discussed open communication and feelings and emotions. Notes:  Patient attended process group as scheduled, patient identified a issue to work on today regarding how they will interact and relate to others. Status After Intervention:  Improved    Participation Level:  Active Listener    Participation Quality: Appropriate, Attentive, and Sharing      Speech:  normal      Thought Process/Content: Logical      Affective Functioning: Congruent      Mood: euthymic      Level of consciousness:  Alert      Response to Learning: Able to verbalize current knowledge/experience      Endings: None Reported    Modes of Intervention: Education, Support, and Socialization      Discipline Responsible: /Counselor      Signature:  Elli Webb [facility]

## 2022-03-04 NOTE — PROGRESS NOTES
[address] [place] [address]      Psychiatric Progress Note    Name:  Mariusz Lan  Date:  3/4/2022  Age:  36 y.o. Sex:  female  Ethnicity:   Primary Care Physician:  Kristofer Bull MD, MD   Patient Care Team:  Patient Care Team:  Kristofer Bull MD as PCP - General (Family Medicine)  Chief Complaint: \"I feel like my depression is bad. \"        Historian:patient  Complaint Type: anxiety, decreased appetite, depression, fatigue, irritability, loss of interest in favorite activities, sleep disturbance and tobacco use  Course of Symptoms: ongoing  Precipitating Factors: history of mental illness           Subjective  Nursing notes were reviewed and patient had a fall last night and reported that she hit the back of her head. She reportedly became dizzy because of GERD. Rapid response was called and patient was cleared by the hospitalist. This morning she has been more calm. She is asking to be placed back on Lithium because that medication help her during the last hospitalization before she decided to no longer take it. She is asking for something for sleep as well and her home medication of Prazosin to be restarted. Reports that she has a small rash on her right arm that is not raised. She continues to rate both depression and anxiety as a 10 on a 0-10 scale. She is refusing to take Saphris as well stating that it makes her mouth numb for 5 minutes after she takes it. Patient reports sleep as \"I did not sleep good last night. \"  Patient has been calm and cooperative with staff and peers. Patient has been compliant with medications. Patient has been attending groups. Patient reports appetite as       \"it is getting better. \"   Patient reports no side effects from medications. Objective  Vitals:    03/04/22 0915   BP: 129/61   Pulse: 95   Resp: 18   Temp: 97.4 °F (36.3 °C)   SpO2: 98%     Previous Psychiatric/Substance Use History      Medical History:  No past medical history on file.      HOLT History: Social History     Substance and Sexual Activity   Alcohol Use Not on file         Social History     Substance and Sexual Activity   Drug Use Not on file        Social History     Tobacco Use   Smoking Status Not on file   Smokeless Tobacco Not on file        Family History:     No family history on file. Mental Status:  Level of consciousness:  within normal limits and awake  Appearance:  well-appearing, street clothes, in chair, good grooming and good hygiene  Behavior/Motor:  no abnormalities noted  Attitude toward examiner:  cooperative, attentive and good eye contact  Speech:  normal rate and normal volume  Mood:  \" I am worried about this rash on my arm. \"  Affect:  mood congruent  Thought processes:  linear and goal directed  Thought content:  Homocidal ideation : denies  Suicidal Ideation:  denies suicidal ideation  Delusions:  no evidence of delusions  Perceptual Disturbance:  denies any perceptual disturbance  Cognition:  oriented to person, place, and time  Concentration : good  Memory intact for recent and remote  Fund of knowledge:  average  Abstract thinking:  adequate  Insight: imrpoved  Judgment:  Good      pantoprazole  40 mg Oral QAM AC    lithium  300 mg Oral TID WC    prazosin  1 mg Oral Nightly    fluconazole  150 mg Oral Once    nicotine  1 patch TransDERmal Daily       Current Medications:  Current Facility-Administered Medications   Medication Dose Route Frequency Provider Last Rate Last Admin    pantoprazole (PROTONIX) tablet 40 mg  40 mg Oral QAM AC Jill Palencia MD   40 mg at 03/04/22 0935    lithium capsule 300 mg  300 mg Oral TID JOSE Reynaga        traZODone (DESYREL) tablet 50 mg  50 mg Oral Nightly PRN Firman Peals, APRN        prazosin (MINIPRESS) capsule 1 mg  1 mg Oral Nightly Firman Peals, APRN        fluconazole (DIFLUCAN) tablet 150 mg  150 mg Oral Once Jill Palencia MD        ondansetron (ZOFRAN-ODT) disintegrating tablet 4 mg  4 mg Oral Q8H PRN Jarrod Walker MD   4 mg at 03/04/22 1048    nicotine (NICODERM CQ) 7 MG/24HR 1 patch  1 patch TransDERmal Daily Eryn Santos MD   1 patch at 03/04/22 0805    diphenhydrAMINE (BENADRYL) tablet 25 mg  25 mg Oral Q6H PRN Eryn Santos MD        calcium carbonate (TUMS) chewable tablet 500 mg  500 mg Oral TID PRN Eryn Santos MD   500 mg at 03/04/22 0806    acetaminophen (TYLENOL) tablet 650 mg  650 mg Oral Q4H PRN Eryn Santos MD   650 mg at 03/03/22 1945    polyethylene glycol (GLYCOLAX) packet 17 g  17 g Oral Daily PRN Eryn Santos MD        hydrOXYzine (ATARAX) tablet 25 mg  25 mg Oral TID PRN Eryn Santos MD   25 mg at 03/04/22 1516    haloperidol lactate (HALDOL) injection 5 mg  5 mg IntraMUSCular Q6H PRN Jovana Navarro MD   5 mg at 03/04/22 0000    LORazepam (ATIVAN) injection 2 mg  2 mg IntraMUSCular Q6H PRN Jovana Navarro MD   2 mg at 03/04/22 0000    albuterol sulfate  (90 Base) MCG/ACT inhaler 2 puff  2 puff Inhalation Q6H PRN Jarrod Walker MD   2 puff at 03/02/22 1827       Psychotherapy:   SUPPORTIVE    DSM V Diagnoses:      Principal Problem:    Bipolar I disorder with mixed features ([facility]  Active Problems:    Borderline personality disorder ([facility] [address]    PTSD (post-traumatic stress disorder)    Tobacco use disorder    Cannabis use disorder, moderate, dependence ([facility] [address]  Resolved Problems:    Depression            Plan:    Encourage group therapy  15 minute safety checks  Medical monitoring by Dr. Alejo Reyes and associates  Will discontinue Trileptal by reports that she is concerned about a rash that has started on her right arm  Will initiate Lithium 300 mg po tid for mood stabilization and chronic suicidal ideation-pt was been on this medication in the past with benefit-She was educated on possible side effects of Lithium including; tremor, nausea, diarrhea, weight gain, euthyroid goiter or hypothyroid goiter, increased TSH and thyroxine levels, leukocytosis, lithium toxicity, renal impairment, arrhythmia, bradycardia, hypotension, rare seizures and rare pseudotumor cerebri. Will initiate Trazodone 50 mg by mouth for sleep  Will restart home medication Prazosin 1mg by mouth  for PTSD symptoms    Amount of time spent with patient:  15 minutes with greater than 50% of the time spent in counseling and collaboration of care.     JSOE Ayers  Clinician Signature: signed electronically

## 2022-03-04 NOTE — PROGRESS NOTES
Progress Note  Gabriela Jacobsen  3/4/2022 6:48 AM  Subjective:   Admit Date:   3/2/2022      CC/ADMIT DX:       Interval History:   Reviewed overnight events and nursing notes. She has n new physical complaints. I have reviewed all labs/diagnostics from the last 24hrs. ROS:   I have done a 10 point ROS and all are negative, except what is mentioned in the HPI. ADULT DIET; Regular    Medications:      nicotine  1 patch TransDERmal Daily    OXcarbazepine  300 mg Oral BID    asenapine maleate  5 mg SubLINGual BID           Objective:   Vitals: /82   Pulse 91   Temp 97.3 °F (36.3 °C)   Resp 22   Ht 5' 7\" (1.702 m)   Wt 218 lb 3.2 oz (99 kg)   SpO2 97%   BMI 34.17 kg/m²  No intake or output data in the 24 hours ending 03/04/22 0648  General appearance: alert and cooperative with exam  Extremities: extremities normal, atraumatic, no cyanosis or edema  Neurologic:  No obvious focal neurologic deficits. Skin: no rashes    Assessment and Plan:   Principal Problem:    Bipolar I disorder with mixed features (HCC)  Active Problems:    Borderline personality disorder (HCC)    Tobacco use disorder    PTSD (post-traumatic stress disorder)    Cannabis use disorder, moderate, dependence ([facility] [address] [facility]  Resolved Problems:    Depression      Plan:  1. Continue present medication(s)   2. Follow with Psych  3. She is medically stable. I will monitor for any changes or concerns. Discharge planning:   her home     Reviewed treatment plans with the patient and/or family.              Electronically signed by Tobias Bland MD on 3/4/2022 at 6:48 AM

## 2022-03-04 NOTE — PROGRESS NOTES
SW met wit pt to complete psychosocial/CSSRS evaluation. Pt continues to exhibit symptoms of psychosis. Unable to assess.

## 2022-03-04 NOTE — PROGRESS NOTES
Group Therapy Note    Date: 03/04/22  Start Time: 1600  End Time:1630    Number of Participants: 4    Type of Group: stress relief    Patient's Goal:  Coping skills    Notes:  patient attended group on coping skills    Status After Intervention:  Improved    Participation Level: Interactive    Participation Quality: Appropriate and Attentive    Speech:  normal    Thought Process/Content: Logical  Linear    Affective Functioning: Congruent    Mood: anxious and depressed    Level of consciousness:  Alert and Oriented x4    Response to Learning: Able to verbalize current knowledge/experience    Modes of Intervention: Education and Support    Discipline Responsible: Registered Nurse    Signature:   Lachelle Vaughan RN

## 2022-03-04 NOTE — PROGRESS NOTES
[facility] Adult Unit Daily Assessment  Nursing Progress Note    Room: [facility]/601-01   Name: Cedric Roberts   Age: 36 y.o. Gender: female   Dx: Bipolar I disorder with mixed features ([address]  Precautions: suicide risk  Inpatient Status: voluntary       SLEEP:    Sleep Quality Fair  Sleep Medications: No   PRN Sleep Meds: Yes       MEDICAL:    Other PRN Meds: Yes   Med Compliant: Yes  Accu-Chek: No  Oxygen/CPAP/BiPAP: No  CIWA/CINA: No   PAIN Assessment: C/O toothache, prn medication given for pain  Side Effects from medication: No    Is Patient experiencing any respiratory symptoms (headache, fever, body aches, cough. Tootie Kid ): no  Patient educated by nursing to practice social distancing, wear masks, wash hands frequently: yes      PSYCH:    Depression: 10   Anxiety: 10   SI passive and without plan   HI Negative for homicidal ideation      AVH:Auditory      GENERAL:    Appetite: no change from normal    Social: Yes   Speech: normal   Appearance: appropriately dressed and healthy looking    GROUP:    Group Participation: Yes  Participation Quality: Interactive    Notes:     1945:  Patient approached desk shortly after shift change reporting that she had a rash to her right inner arm and believes it may be detergent related. She is requesting prn cream for itching at this time. Informed patient that nothing is ordered and MD will need to be contacted for further orders. She also reports heartburn at this time. Informed patient I would ask physician for prn medication when I contact him. Patient agreeable for this nurse to serg her rash, so we are able to assess any worsening in rash. Area is slightly reddened, with skin intact and no drainage noted. 2010:  Dr Marcy Aguilar contacted regarding pt c/o rash to right arm.   Reviewed patient medication list per his request and he requested pm dose of Trileptal be held tonight to assess if allergy is medication related vs enviormental.  Also informed him of pt c/o heartburn and order

## 2022-03-05 LAB
ALBUMIN SERPL-MCNC: 4.3 G/DL (ref 3.5–5.2)
ALP BLD-CCNC: 68 U/L (ref 35–104)
ALT SERPL-CCNC: 15 U/L (ref 5–33)
ANION GAP SERPL CALCULATED.3IONS-SCNC: 10 MMOL/L (ref 7–19)
AST SERPL-CCNC: 14 U/L (ref 5–32)
BILIRUB SERPL-MCNC: 0.3 MG/DL (ref 0.2–1.2)
BUN BLDV-MCNC: 13 MG/DL (ref 6–20)
CALCIUM SERPL-MCNC: 9.4 MG/DL (ref 8.6–10)
CHLORIDE BLD-SCNC: 104 MMOL/L (ref 98–111)
CO2: 24 MMOL/L (ref 22–29)
CREAT SERPL-MCNC: 0.7 MG/DL (ref 0.5–0.9)
GFR AFRICAN AMERICAN: >59
GFR NON-AFRICAN AMERICAN: >60
GLUCOSE BLD-MCNC: 95 MG/DL (ref 74–109)
POTASSIUM SERPL-SCNC: 4.4 MMOL/L (ref 3.5–5)
SODIUM BLD-SCNC: 138 MMOL/L (ref 136–145)
TOTAL PROTEIN: 6.2 G/DL (ref 6.6–8.7)

## 2022-03-05 PROCEDURE — 80053 COMPREHEN METABOLIC PANEL: CPT

## 2022-03-05 PROCEDURE — 1240000000 HC EMOTIONAL WELLNESS R&B

## 2022-03-05 PROCEDURE — 93005 ELECTROCARDIOGRAM TRACING: CPT | Performed by: PSYCHIATRY & NEUROLOGY

## 2022-03-05 PROCEDURE — 36415 COLL VENOUS BLD VENIPUNCTURE: CPT

## 2022-03-05 PROCEDURE — 6370000000 HC RX 637 (ALT 250 FOR IP): Performed by: PSYCHIATRY & NEUROLOGY

## 2022-03-05 PROCEDURE — 99232 SBSQ HOSP IP/OBS MODERATE 35: CPT | Performed by: PSYCHIATRY & NEUROLOGY

## 2022-03-05 PROCEDURE — 6370000000 HC RX 637 (ALT 250 FOR IP): Performed by: FAMILY MEDICINE

## 2022-03-05 PROCEDURE — 6360000002 HC RX W HCPCS: Performed by: PSYCHIATRY & NEUROLOGY

## 2022-03-05 PROCEDURE — 6370000000 HC RX 637 (ALT 250 FOR IP): Performed by: NURSE PRACTITIONER

## 2022-03-05 RX ORDER — ASENAPINE 5 MG/1
5 TABLET SUBLINGUAL 3 TIMES DAILY PRN
Status: DISCONTINUED | OUTPATIENT
Start: 2022-03-05 | End: 2022-03-07 | Stop reason: HOSPADM

## 2022-03-05 RX ADMIN — TRAZODONE HYDROCHLORIDE 50 MG: 50 TABLET ORAL at 20:48

## 2022-03-05 RX ADMIN — ANTACID TABLETS 500 MG: 500 TABLET, CHEWABLE ORAL at 12:39

## 2022-03-05 RX ADMIN — ACETAMINOPHEN 650 MG: 325 TABLET ORAL at 10:20

## 2022-03-05 RX ADMIN — POLYETHYLENE GLYCOL 3350 17 G: 17 POWDER, FOR SOLUTION ORAL at 16:09

## 2022-03-05 RX ADMIN — HYDROXYZINE HYDROCHLORIDE 50 MG: 25 TABLET, FILM COATED ORAL at 13:51

## 2022-03-05 RX ADMIN — ASENAPINE MALEATE 5 MG: 5 TABLET SUBLINGUAL at 10:47

## 2022-03-05 RX ADMIN — ONDANSETRON 4 MG: 4 TABLET, ORALLY DISINTEGRATING ORAL at 16:56

## 2022-03-05 RX ADMIN — ACETAMINOPHEN 650 MG: 325 TABLET ORAL at 16:01

## 2022-03-05 RX ADMIN — ASENAPINE MALEATE 5 MG: 5 TABLET SUBLINGUAL at 18:17

## 2022-03-05 RX ADMIN — ASENAPINE MALEATE 5 MG: 5 TABLET SUBLINGUAL at 19:55

## 2022-03-05 RX ADMIN — LITHIUM CARBONATE 300 MG: 300 CAPSULE, GELATIN COATED ORAL at 07:58

## 2022-03-05 RX ADMIN — PANTOPRAZOLE SODIUM 40 MG: 40 TABLET, DELAYED RELEASE ORAL at 06:00

## 2022-03-05 RX ADMIN — CARIPRAZINE 1.5 MG: 1.5 CAPSULE, GELATIN COATED ORAL at 13:51

## 2022-03-05 RX ADMIN — PRAZOSIN HYDROCHLORIDE 1 MG: 1 CAPSULE ORAL at 20:48

## 2022-03-05 RX ADMIN — HYDROXYZINE HYDROCHLORIDE 50 MG: 25 TABLET, FILM COATED ORAL at 08:34

## 2022-03-05 RX ADMIN — HALOPERIDOL LACTATE 5 MG: 5 INJECTION, SOLUTION INTRAMUSCULAR at 13:51

## 2022-03-05 ASSESSMENT — PAIN SCALES - GENERAL
PAINLEVEL_OUTOF10: 9
PAINLEVEL_OUTOF10: 5

## 2022-03-05 NOTE — PROGRESS NOTES
Patient slid self to floor and said she was having a seizure. Patient is alert and able to communicate during this episode. Yelling, crying  and dry heaving. Patient got up walked herself to her bed and laid down. Will continue to monitor patient.

## 2022-03-05 NOTE — PROGRESS NOTES
SW unsuccessful at completing assessments with this pt for the last 36 hours due to refusal to cooperate and her being in a very emotional state.

## 2022-03-05 NOTE — PROGRESS NOTES
Progress Note  Gabriela Jacobsen  3/5/2022 2:21 PM  Subjective:   Admit Date:   3/2/2022      CC/ADMIT DX:       Interval History:   Reviewed overnight events and nursing notes. She has no new medical issues. I have reviewed all labs/diagnostics from the last 24hrs. ROS:   I have done a 10 point ROS and all are negative, except what is mentioned in the HPI. ADULT DIET; Regular    Medications:      cariprazine hcl  1.5 mg Oral Daily    pantoprazole  40 mg Oral QAM AC    prazosin  1 mg Oral Nightly    nicotine  1 patch TransDERmal Daily           Objective:   Vitals: BP (!) 123/58   Pulse 107   Temp 97.3 °F (36.3 °C) (Temporal)   Resp 16   Ht 5' 7\" (1.702 m)   Wt 218 lb 3.2 oz (99 kg)   SpO2 97%   BMI 34.17 kg/m²  No intake or output data in the 24 hours ending 03/05/22 1421  General appearance: alert and cooperative with exam  Extremities: extremities normal, atraumatic, no cyanosis or edema  Neurologic:  No obvious focal neurologic deficits. Skin: no rashes    Assessment and Plan:   Principal Problem:    Bipolar I disorder with mixed features (HCC)  Active Problems:    Borderline personality disorder (HCC)    Tobacco use disorder    PTSD (post-traumatic stress disorder)    Cannabis use disorder, moderate, dependence ([address]  Resolved Problems:    Depression    Nausea    Plan:  1. Continue present medication(s)   2. Follow with Psych  3. Continue with supportive care      Discharge planning:   her home     Reviewed treatment plans with the patient and/or family.              Electronically signed by Silvia Corral MD on 3/5/2022 at 2:21 PM

## 2022-03-05 NOTE — PROGRESS NOTES
Progress Note  Cal Neely  3/4/2022 9:04 PM  Subjective:   Admit Date:   3/2/2022      CC/ADMIT DX:       Interval History:   Reviewed overnight events and nursing notes. She has c/o nausea. .   I have reviewed all labs/diagnostics from the last 24hrs. ROS:   I have done a 10 point ROS and all are negative, except what is mentioned in the HPI. ADULT DIET; Regular    Medications:      pantoprazole  40 mg Oral QAM AC    lithium  300 mg Oral TID WC    prazosin  1 mg Oral Nightly    nicotine  1 patch TransDERmal Daily           Objective:   Vitals: /73   Pulse 81   Temp 97.7 °F (36.5 °C) (Temporal)   Resp 18   Ht 5' 7\" (1.702 m)   Wt 218 lb 3.2 oz (99 kg)   SpO2 97%   BMI 34.17 kg/m²  No intake or output data in the 24 hours ending 03/04/22 2104  General appearance: alert and cooperative with exam  Extremities: extremities normal, atraumatic, no cyanosis or edema  Neurologic:  No obvious focal neurologic deficits. Skin: no rashes    Assessment and Plan:   Principal Problem:    Bipolar I disorder with mixed features (HCC)  Active Problems:    Borderline personality disorder (HCC)    Tobacco use disorder    PTSD (post-traumatic stress disorder)    Cannabis use disorder, moderate, dependence ([facility] [address]  Resolved Problems:    Depression    Nausea    Plan:  1. Continue present medication(s)   2. Follow with Psych  3. Supportive care for nausea      Discharge planning:   her home     Reviewed treatment plans with the patient and/or family.              Electronically signed by Andrez Paiz MD on 3/4/2022 at 9:04 PM

## 2022-03-05 NOTE — GROUP NOTE
Group Therapy Note    Date: 3/5/2022    Group Start Time: 2428  Group End Time: 4323  Group Topic: Recovery    [facility]    Manuel Bridges             Patient's Goal:  Medication Compliance    Notes: Pt discussed managing their mental health. Pt learned that being compliant with their medications, going to their therapy and dr appointments, and leaning on their support system when needed are the best ways to help them manage their mental health. Status After Intervention:  Unchanged    Participation Level:  Active Listener     Participation Quality: Appropriate      Speech:  normal      Thought Process/Content: Logical      Affective Functioning: Congruent      Mood: euthymic      Level of consciousness: Attentive      Response to Learning: Able to verbalize/acknowledge new learning and Able to retain information      Endings: None Reported    Modes of Intervention: Support, Socialization and Exploration      Discipline Responsible: Psychoeducational Specialist      Signature:  Manuel Bridges

## 2022-03-05 NOTE — PROGRESS NOTES
[facility] Daily Shift Assessment  Nursing Progress Note    Room: 0601/601-01 Name: Gagandeep Banuelos Age: 36 y.o. Gender: female   Dx: Bipolar I disorder with mixed features ([address]  Precautions: suicide risk  Target Symptoms:   Accu-Chek: NoSleep: No,Sleep Quality Poor SI No AVH auditory [facility]  ADLs: Yes Speech: normal Depression: 10 Anxiety: 10   Participation LevelActive Listener  Appetite: Good  Respiratory symptoms: No Headache: No Body aches: No Fever: No Cough: No  Patients encouraged to wear masks, wash hands frequently and practice social distancing while on the unit: Yes  Visitation: No   Participation QualityAppropriate and Attentive    Complaints:    Notes: Patient is alert and oriented x 4. Pleasant, calm and cooperative. Wearing casual attire. Well groomed. Compliant with medications. Lithium on hold as of right now per Dr. Vasu Agarwal, patient is developing rash. Reports poor sleep, appetite is good. Patient has been labile this am. Crying on and off. Exaggerated. Not social this am but did attend group. Denies at this time but does report intermittent suicidal ideation and auditory hallucinations.    Signature: Electronically signed by Annette Siegel RN on 3/5/22 at 10:40 AM CST

## 2022-03-05 NOTE — PROGRESS NOTES
[facility] Adult Unit Daily Assessment  Nursing Progress Note    Room: [facility]/601-01   Name: Kimberly Christensen   Age: 36 y.o. Gender: female   Dx: Bipolar I disorder with mixed features ([address]  Precautions: suicide risk  Inpatient Status: voluntary       SLEEP:    Sleep Quality Fair  Sleep Medications: Yes minipress 1mg  PRN Sleep Meds: Yes trazodone 50 mg      MEDICAL:    Other PRN Meds: Yes benadryl 25mg for itching  Med Compliant: Yes  Accu-Chek: No  Oxygen/CPAP/BiPAP: No  CIWA/CINA: No   PAIN Assessment: none  Side Effects from medication: No    Is Patient experiencing any respiratory symptoms (headache, fever, body aches, cough. Saul Chanel ): no  Patient educated by nursing to practice social distancing, wear masks, wash hands frequently: yes      PSYCH:    Depression: 10   Anxiety: 10   SI denies suicidal ideation   HI Negative for homicidal ideation      AVH:Absent      GENERAL:    Appetite: decreased    Social: Yes   Speech: normal   Appearance: appropriately dressed, appropriately groomed and healthy looking    GROUP:    Group Participation: Yes  Participation Quality: Active Listener    Notes: Pt is in the day area during this assessment she is calm ,pleasant and cooperative. Pt was observed Watching TV ,not really socializing but among peers. Pt reports her appetite as decreased and her sleep is poor. Pt came out of her room and to the desk shortly after going to sleep,very anxious about itching and the bathroom light coming on in her room. Administered benadryl 25 mg as ordered for itching ,and offered the pt zinc paste for itching. Pt returned to bed and to sleep. Will continue to monitor.

## 2022-03-05 NOTE — PROGRESS NOTES
Department of Psychiatry  Attending Progress Note     Chief complaint:  'I'm not ok! \"     SUBJECTIVE:   Chart reviewed, discussed with the team. No major issues overnight. Patient is med-compliant. No SEs. Performs ADLs. Still very tearful and anxious. Patient seen in her room. Denies SI/HI/AVH. Rates depression 9/10, anxiety 10/10. Slept 5h. C/o rash which started 2 days ago. Becomes tearful. Starts screaming \"I'm not ok! Meds not working! \" Ran to the bathroom. OBJECTIVE    Physical  Wt Readings from Last 3 Encounters:   03/02/22 218 lb 3.2 oz (99 kg)   02/17/22 222 lb (100.7 kg)     Temp Readings from Last 3 Encounters:   03/05/22 97.3 °F (36.3 °C) (Temporal)   02/22/22 96.2 °F (35.7 °C) (Temporal)     BP Readings from Last 3 Encounters:   03/05/22 (!) 123/58   02/22/22 114/70     Pulse Readings from Last 3 Encounters:   03/05/22 107   02/22/22 90        Review of Systems: 14-point review of systems negative except as described above    Mental Status Examination:   Appearance:  Stated age. Gait stable. No abnormal movements or tremor. Behavior: Restless  Speech: Loud at times  Mood: \"I'm not ok! \"   Affect: Labile   Thought Process: Appears linear. Thought Content:  Denies SI/HI. No overt delusions or paranoia appreciated. Perceptions: Denies auditory or visual hallucinations at present time. Not responding to internal stimuli. Concentration: Intact. Orientation: to person, place, date, and situation. Language: Intact. Fund of information: Intact. Memory: Recent and remote appear intact. Neurovegitative: Fair appetite and sleep. Insight: Impaired. Judgment: Impaired.     Data  Lab Results   Component Value Date    WBC 7.7 02/17/2022    HGB 13.3 02/17/2022    HCT 43.1 02/17/2022    .1 (H) 02/17/2022     02/17/2022      Lab Results   Component Value Date     03/05/2022    K 4.4 03/05/2022     03/05/2022    CO2 24 03/05/2022    BUN 13 03/05/2022    CREATININE 0.7 03/05/2022    GLUCOSE 95 03/05/2022    CALCIUM 9.4 03/05/2022    PROT 6.2 (L) 03/05/2022    LABALBU 4.3 03/05/2022    BILITOT 0.3 03/05/2022    ALKPHOS 68 03/05/2022    AST 14 03/05/2022    ALT 15 03/05/2022    LABGLOM >60 03/05/2022    GFRAA >59 03/05/2022       Medications    Current Facility-Administered Medications:     asenapine maleate (SAPHRIS) sublingual tablet 5 mg, 5 mg, SubLINGual, TID PRN, Digna Looney MD, 5 mg at 03/05/22 1047    pantoprazole (PROTONIX) tablet 40 mg, 40 mg, Oral, QAM AC, David Adair MD, 40 mg at 03/05/22 0600    lithium capsule 300 mg, 300 mg, Oral, TID WC, Christy Winn APRN, 300 mg at 03/05/22 0758    traZODone (DESYREL) tablet 50 mg, 50 mg, Oral, Nightly PRN, Megha Arango APRN, 50 mg at 03/04/22 2102    prazosin (MINIPRESS) capsule 1 mg, 1 mg, Oral, Nightly, Megha Arango APRN, 1 mg at 03/04/22 2057    ondansetron (ZOFRAN-ODT) disintegrating tablet 4 mg, 4 mg, Oral, Q8H PRN, David Adair MD, 4 mg at 03/04/22 1048    hydrOXYzine (ATARAX) tablet 50 mg, 50 mg, Oral, TID PRN, Megha Arango APRN, 50 mg at 03/05/22 0834    nicotine (NICODERM CQ) 7 MG/24HR 1 patch, 1 patch, TransDERmal, Daily, Reece Ragland MD, 1 patch at 03/05/22 0758    diphenhydrAMINE (BENADRYL) tablet 25 mg, 25 mg, Oral, Q6H PRN, Reece Ragland MD, 25 mg at 03/04/22 2236    calcium carbonate (TUMS) chewable tablet 500 mg, 500 mg, Oral, TID PRN, Reece Ragland MD, 500 mg at 03/05/22 1239    acetaminophen (TYLENOL) tablet 650 mg, 650 mg, Oral, Q4H PRN, Reece Ragland MD, 650 mg at 03/05/22 1020    polyethylene glycol (GLYCOLAX) packet 17 g, 17 g, Oral, Daily PRN, Reece Ragland MD, 17 g at 03/04/22 1703    haloperidol lactate (HALDOL) injection 5 mg, 5 mg, IntraMUSCular, Q6H PRN, Digna Looney MD, 5 mg at 03/04/22 0000    LORazepam (ATIVAN) injection 2 mg, 2 mg, IntraMUSCular, Q6H PRN, Digna Looney MD, 2 mg at 03/04/22 0000    albuterol sulfate  (90 Base) MCG/ACT inhaler 2 puff, 2 puff, Inhalation, Q6H PRN, Samuel Casey MD, 2 puff at 03/02/22 3889    ASSESSMENT AND PLAN  DSM 5 DIAGNOSIS  Impression  Bipolar I disorder with mixed features (HCC)  Anxiety unspecified  Borderline personality disorder (HCC)  PTSD (post-traumatic stress disorder)  Tobacco use disorder  Cannabis use disorder, moderate, dependence   Rash    Continue to observe. Med adjustment. Plan:   1. Psychiatric Medications:   DC Lithium - concern for rash. A trial of Vraylar for mood stabilization. PRN Saphris for agitation. Pt was instructed regarding the risks and benefits of antipsychotic therapy including but not limited to Neuroleptic malignant syndrome (tetrad of distinctive clinical features: fever, rigidity, mental status changes, and autonomic instability), EPS (Akathisia, Parkinsonism, Tardive dyskinesia [repetitive movements of mouth, tongue, face, trunk, or extremities], nausea, constipation, abdominal pain, galactorrhea, gynecomastia, Dizziness, Sedation, Anticholinergic effects, Hypotension, Weight gain, DM, DSL, hyperglycemia, QTc prolongation. Additionally, in regards to tardive dyskinesia pt was instructed about increase risk of permanency of these movements. Monitor for side effects. The risks, benefits, side effects, indications, contraindications, alternatives and adverse effects of the medications have been discussed with patient. 2. Continue to provide supportive psychotherapy. Encourage socialization and participation in recreational activities. Work on coping skills. 3. Medical Issues:    Continue medical monitoring by Dr. Jamila Elliott and associates. Monitor rash. 4. Disposition:     to provide outpatient resources and facilitate disposition.      Amount of time spent with patient:      25 minutes with greater than 50 % of the time spent in counseling and collaboration of care

## 2022-03-05 NOTE — PROGRESS NOTES
Group Note    Number of Participants in Group: 4  Number of Patients on Unit:6      Patient attended group:Yes  Reason for Absence:  Intervention for patient absence:        Type of Group:   Wrap-Up/Relaxation    Patient's Goal: See wrap up group sheet    Participation Level:   interactive           Patient Response to Learning: Yes    Patient's Behavior: Cooperative and Pleasant    Is Patient Social/Interacting: Yes    Relaxation:   Television:Yes   Reading:No   Game/Puzzle:No   Phone: No       Notes/Comments:      Please see patient's wrap up group sheet for patient's comments       Electronically signed by Ivis Carroll RN on 3/4/22 at 7:57 PM CST

## 2022-03-06 LAB
EKG P AXIS: 31 DEGREES
EKG P-R INTERVAL: 160 MS
EKG Q-T INTERVAL: 358 MS
EKG QRS DURATION: 94 MS
EKG QTC CALCULATION (BAZETT): 401 MS
EKG T AXIS: 27 DEGREES
GLUCOSE BLD-MCNC: 113 MG/DL (ref 70–99)
PERFORMED ON: ABNORMAL

## 2022-03-06 PROCEDURE — 6360000002 HC RX W HCPCS: Performed by: PSYCHIATRY & NEUROLOGY

## 2022-03-06 PROCEDURE — 6370000000 HC RX 637 (ALT 250 FOR IP): Performed by: PSYCHIATRY & NEUROLOGY

## 2022-03-06 PROCEDURE — 93010 ELECTROCARDIOGRAM REPORT: CPT | Performed by: INTERNAL MEDICINE

## 2022-03-06 PROCEDURE — 1240000000 HC EMOTIONAL WELLNESS R&B

## 2022-03-06 PROCEDURE — 82947 ASSAY GLUCOSE BLOOD QUANT: CPT

## 2022-03-06 PROCEDURE — 6370000000 HC RX 637 (ALT 250 FOR IP): Performed by: NURSE PRACTITIONER

## 2022-03-06 PROCEDURE — 6370000000 HC RX 637 (ALT 250 FOR IP): Performed by: FAMILY MEDICINE

## 2022-03-06 RX ADMIN — ASENAPINE MALEATE 5 MG: 5 TABLET SUBLINGUAL at 04:25

## 2022-03-06 RX ADMIN — CARIPRAZINE 1.5 MG: 1.5 CAPSULE, GELATIN COATED ORAL at 07:42

## 2022-03-06 RX ADMIN — MUPIROCIN: 20 OINTMENT TOPICAL at 23:10

## 2022-03-06 RX ADMIN — ACETAMINOPHEN 650 MG: 325 TABLET ORAL at 14:53

## 2022-03-06 RX ADMIN — ASENAPINE MALEATE 5 MG: 5 TABLET SUBLINGUAL at 12:32

## 2022-03-06 RX ADMIN — ASENAPINE MALEATE 5 MG: 5 TABLET SUBLINGUAL at 17:30

## 2022-03-06 RX ADMIN — ANTACID TABLETS 500 MG: 500 TABLET, CHEWABLE ORAL at 16:55

## 2022-03-06 RX ADMIN — ANTACID TABLETS 500 MG: 500 TABLET, CHEWABLE ORAL at 00:48

## 2022-03-06 RX ADMIN — TRAZODONE HYDROCHLORIDE 50 MG: 50 TABLET ORAL at 20:59

## 2022-03-06 RX ADMIN — PANTOPRAZOLE SODIUM 40 MG: 40 TABLET, DELAYED RELEASE ORAL at 06:08

## 2022-03-06 RX ADMIN — HYDROXYZINE HYDROCHLORIDE 50 MG: 25 TABLET, FILM COATED ORAL at 15:54

## 2022-03-06 RX ADMIN — ALBUTEROL SULFATE 2 PUFF: 90 AEROSOL, METERED RESPIRATORY (INHALATION) at 08:50

## 2022-03-06 RX ADMIN — HYDROXYZINE HYDROCHLORIDE 50 MG: 25 TABLET, FILM COATED ORAL at 07:57

## 2022-03-06 RX ADMIN — MUPIROCIN: 20 OINTMENT TOPICAL at 09:37

## 2022-03-06 RX ADMIN — HYDROXYZINE HYDROCHLORIDE 50 MG: 25 TABLET, FILM COATED ORAL at 00:08

## 2022-03-06 RX ADMIN — HALOPERIDOL LACTATE 5 MG: 5 INJECTION, SOLUTION INTRAMUSCULAR at 09:37

## 2022-03-06 RX ADMIN — PRAZOSIN HYDROCHLORIDE 1 MG: 1 CAPSULE ORAL at 20:59

## 2022-03-06 ASSESSMENT — PAIN SCALES - GENERAL: PAINLEVEL_OUTOF10: 9

## 2022-03-06 NOTE — PROGRESS NOTES
Group Therapy Note    Start Time: 900  End Time:  250  Number of Participants: 7    Type of Group: Community Meeting       Patient's Goal:        Notes:  Patient didn't attend group today    Participation Level:  Active Listener       Participation Quality: Appropriate      Thought Process/Content: Logical      Affective Functioning: Congruent      Mood: calm      Level of consciousness:  Alert      Modes of Intervention: Support      Discipline Responsible: Behavioral Health Tech II      Signature:  Dayna Key

## 2022-03-06 NOTE — PROGRESS NOTES
Pt approached the desk , appears agitated and angry also crying. She says she just can't deal with the level of anxiety she is feeling. When ask if talking about it would make her feel better,she replied she could not talk about again,we needed to help her. Pt has saphris 5mg for agitation . administered saphris 5mg for agitation. Assisted pt to her room and to her bed. Will continue to monitor.

## 2022-03-06 NOTE — PROGRESS NOTES
Patient threw self on floor by patients door, did not hit head. No harm to patient. States \"im having a seizure. \" patient is able to talk and communicate with staff. Walked self back to bed. She then began to hit her left arm hard with right hand repeatedly. Haldol 5 mg IM administered.

## 2022-03-06 NOTE — PROGRESS NOTES
[facility] Adult Unit Daily Assessment  Nursing Progress Note    [facility]   Name: Yusuf Manzo   Age: 36 y.o. Gender: female   Dx: Bipolar I disorder with mixed features ([address]  Precautions: suicide risk  Inpatient Status: voluntary       SLEEP:    Sleep Quality Poor  Sleep Medications: Yes   PRN Sleep Meds: Yes trazodone 50mg      MEDICAL:    Other PRN Meds: Yes saphris 5mg atarax 50mg  Med Compliant: Yes  Accu-Chek: No  Oxygen/CPAP/BiPAP: No  CIWA/CINA: No   PAIN Assessment: none  Side Effects from medication: No    Is Patient experiencing any respiratory symptoms (headache, fever, body aches, cough. Reddy Awkward ): no  Patient educated by nursing to practice social distancing, wear masks, wash hands frequently: yes      PSYCH:    Depression: 10   Anxiety: 10   SI denies suicidal ideation   HI Negative for homicidal ideation      AVH:Absent      GENERAL:    Appetite: no change from normal    Social: No   Speech: normal   Appearance: appropriately dressed, disheveled and healthy looking    GROUP:    Group Participation: Yes  Participation Quality: Active Listener    Notes: Pt was in her room during this interview,she is cooperative ,answers questions appropriately,she is anxious,and irritable. States she can not talk about what has happened again. Will continue to monitor for safety.

## 2022-03-06 NOTE — PROGRESS NOTES
Progress Note  Gabriela Jacobsen  3/6/2022 9:04 AM  Subjective:   Admit Date:   3/2/2022      CC/ADMIT DX:       Interval History:   Reviewed overnight events and nursing notes. She denies any new physical complaints. I have reviewed all labs/diagnostics from the last 24hrs. ROS:   I have done a 10 point ROS and all are negative, except what is mentioned in the HPI. ADULT DIET; Regular    Medications:      mupirocin   Topical BID    cariprazine hcl  1.5 mg Oral Daily    pantoprazole  40 mg Oral QAM AC    prazosin  1 mg Oral Nightly    nicotine  1 patch TransDERmal Daily           Objective:   Vitals: /70   Pulse 103   Temp 97.9 °F (36.6 °C) (Temporal)   Resp 16   Ht 5' 7\" (1.702 m)   Wt 218 lb 3.2 oz (99 kg)   SpO2 98%   BMI 34.17 kg/m²  No intake or output data in the 24 hours ending 03/06/22 0904  General appearance: alert and cooperative with exam  Extremities: extremities normal, atraumatic, no cyanosis or edema  Neurologic:  No obvious focal neurologic deficits. Skin: no rashes    Assessment and Plan:   Principal Problem:    Bipolar I disorder with mixed features (HCC)  Active Problems:    Borderline personality disorder (HCC)    Tobacco use disorder    PTSD (post-traumatic stress disorder)    Cannabis use disorder, moderate, dependence ([facility] [address]  Resolved Problems:    Depression    Nausea    Plan:  1. Continue present medication(s)   2. Follow with Psych  3. Bactroban for wound on her wrist      Discharge planning:   her home     Reviewed treatment plans with the patient and/or family.              Electronically signed by Yves Cristina MD on 3/6/2022 at 9:04 AM

## 2022-03-07 VITALS
TEMPERATURE: 98.1 F | DIASTOLIC BLOOD PRESSURE: 75 MMHG | HEART RATE: 100 BPM | SYSTOLIC BLOOD PRESSURE: 114 MMHG | HEIGHT: 67 IN | OXYGEN SATURATION: 97 % | BODY MASS INDEX: 34.25 KG/M2 | WEIGHT: 218.2 LBS | RESPIRATION RATE: 20 BRPM

## 2022-03-07 PROCEDURE — 99238 HOSP IP/OBS DSCHRG MGMT 30/<: CPT | Performed by: NURSE PRACTITIONER

## 2022-03-07 PROCEDURE — 6370000000 HC RX 637 (ALT 250 FOR IP): Performed by: NURSE PRACTITIONER

## 2022-03-07 PROCEDURE — 6370000000 HC RX 637 (ALT 250 FOR IP): Performed by: PSYCHIATRY & NEUROLOGY

## 2022-03-07 PROCEDURE — 6370000000 HC RX 637 (ALT 250 FOR IP): Performed by: FAMILY MEDICINE

## 2022-03-07 PROCEDURE — 5130000000 HC BRIDGE APPOINTMENT

## 2022-03-07 RX ORDER — ASENAPINE MALEATE 2.5 MG/1
5 TABLET SUBLINGUAL 2 TIMES DAILY PRN
Qty: 10 TABLET | Refills: 0
Start: 2022-03-07 | End: 2022-03-17

## 2022-03-07 RX ORDER — TRAZODONE HYDROCHLORIDE 50 MG/1
50 TABLET ORAL NIGHTLY PRN
Qty: 14 TABLET | Refills: 0 | Status: SHIPPED | OUTPATIENT
Start: 2022-03-07 | End: 2022-03-21

## 2022-03-07 RX ORDER — PRAZOSIN HYDROCHLORIDE 1 MG/1
1 CAPSULE ORAL NIGHTLY
Qty: 14 CAPSULE | Refills: 0 | Status: SHIPPED | OUTPATIENT
Start: 2022-03-07 | End: 2022-03-21

## 2022-03-07 RX ADMIN — HYDROXYZINE HYDROCHLORIDE 50 MG: 25 TABLET, FILM COATED ORAL at 08:35

## 2022-03-07 RX ADMIN — CARIPRAZINE 1.5 MG: 1.5 CAPSULE, GELATIN COATED ORAL at 08:32

## 2022-03-07 RX ADMIN — ASENAPINE MALEATE 5 MG: 5 TABLET SUBLINGUAL at 11:29

## 2022-03-07 RX ADMIN — PANTOPRAZOLE SODIUM 40 MG: 40 TABLET, DELAYED RELEASE ORAL at 06:19

## 2022-03-07 NOTE — PROGRESS NOTES
[facility]  Discharge Note  Bridge Appointment completed: Reviewed Discharge Instructions with patient. Patient verbalizes understanding and agreement with the discharge plan using the teachback method. Referral for Outpatient Tobacco Cessation Counseling, upon discharge (serg X if applicable and completed):    ( )  Hospital staff assisted patient to call Quit Line or faxed referral                                   during hospitalization                  ( )  Recognizing danger situations (included triggers and roadblocks), if not completed on admission                    ( )  Coping skills (new ways to manage stress, exercise, relaxation techniques, changing routine, distraction), if not completed on admission                                                           ( )  Basic information about quitting (benefits of quitting, techniques in how to quit, available resources, if not completed on admission  ( ) Referral for counseling faxed to [facility]   ( ) Patient refused referral  ( ) Patient refused counseling  (x ) Patient refused smoking cessation medication upon discharge    Vaccinations (serg X if applicable and completed):  ( ) Patient states already received influenza vaccine elsewhere  ( ) Patient received influenza vaccine during this hospitalization  ( x) Patient refused influenza vaccine at this time      Pt discharged with followings belongings:   Dental Appliances: None  Vision - Corrective Lenses: Glasses (pt did not bring them at this time)  Hearing Aid: None  Jewelry: None  Body Piercings Removed: No  Clothing: Pants,Shirt,Socks,Undergarments (Comment)  Were All Patient Medications Collected?: Yes  Other Valuables: Cell phone   Valuables retrieved from [employer] and returned to patient. Patient left department with staff   via ambulatory  , discharged to home  . Patient education on aftercare instructions: yes  Patient verbalize understanding of AVS:  yes. Suicidal Ideations? No AVH? denies HI?  Negative for homicidal ideation       Status EXAM upon discharge:  Status and Exam  Normal: No  Facial Expression: Worried  Affect: Congruent  Level of Consciousness: Alert  Mood:Normal: No  Mood: Depressed,Anxious,Labile  Motor Activity:Normal: No  Motor Activity: Decreased  Interview Behavior: Cooperative  Preception: [place] to Person,[place] to Time,[place] to Place,[place] to Situation  Attention:Normal: No  Attention: Distractible  Thought Processes: Circumstantial  Thought Content:Normal: No  Thought Content: Preoccupations  Hallucinations: None  Delusions: No  Delusions: Persecution  Memory:Normal: No  Memory: Poor Recent,Poor Remote  Insight and Judgment: No  Insight and Judgment: Poor Judgment,Poor Insight  Present Suicidal Ideation: No  Present Homicidal Ideation: No

## 2022-03-07 NOTE — PLAN OF CARE
Group Therapy Note     Date: 3/7/2022  Start Time: 1100  End Time:  1130  Number of Participants: 9     Type of Group: Psychoeducation     Wellness Binder Information  Module Name:  staying well  Session Number:  1     Patient's Goal:  daily maintenance coping skills     Notes:  pt acknowledged use of positive coping skills daily to help stay well.      Status After Intervention:  Improved     Participation Level: Interactive     Participation Quality: Appropriate, Attentive, and Sharing        Speech:  normal        Thought Process/Content: Logical        Affective Functioning: Congruent        Mood: congruent        Level of consciousness:  Alert, Oriented x4, and Attentive        Response to Learning: Able to verbalize current knowledge/experience        Endings: None Reported     Modes of Intervention: Education        Discipline Responsible: Psychoeducational Specialist        Signature:  Ira Lopez
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
## 2022-03-07 NOTE — PROGRESS NOTES
[facility] Individual Group Progress Note    Neris Terry  1990  3/7/2022    Patient attended morning group.           Lucretai Quiñones RN  3/7/2022 10:37 AM

## 2022-03-07 NOTE — PROGRESS NOTES
[facility] Adult Unit Daily Assessment  Nursing Progress Note    Room: [facility]/601-01   Name: Ryan Penny   Age: 36 y.o. Gender: female   Dx: Bipolar I disorder with mixed features ([address]  Precautions: suicide risk  Inpatient Status: voluntary       SLEEP:    Sleep Quality Fair  Sleep Medications: Yes prazosin 1mg   PRN Sleep Meds: Yes [id number]      MEDICAL:    Other PRN Meds: Yes   Med Compliant: Yes  Accu-Chek: No  Oxygen/CPAP/BiPAP: No  CIWA/CINA: No   PAIN Assessment: present - adequately treated  Side Effects from medication: No    Is Patient experiencing any respiratory symptoms (headache, fever, body aches, cough. [place] ): no  Patient educated by nursing to practice social distancing, wear masks, wash hands frequently: yes      PSYCH:    Depression: 7   Anxiety: 9   SI denies suicidal ideation   HI Negative for homicidal ideation      AVH:Absent      GENERAL:    Appetite: no change from normal    Social: Yes   Speech: normal   Appearance: appropriately dressed, appropriately groomed, good hygiene and healthy looking    GROUP:    Group Participation: Yes  Participation Quality: Active Listener    Notes: Pt is in the dining area during this assessment. Pt reported she was feeling okay that she was not having any problems the patient then rated depression 7 and anxiety 9. Pt stated I was making her more anxious ,so I discontinued the assessment. Pt had been observed socializing with peers ,watching TV and talking on the phone. Will continue to monitor.

## 2022-03-07 NOTE — PROGRESS NOTES
Group Note    Number of Participants in Group: 12  Number of Patients on Unit:15      Patient attended group:Yes  Reason for Absence:  Intervention for patient absence:        Type of Group:   Wrap-Up/Relaxation    Patient's Goal: See wrap up group sheet    Participation Level:    Active participant         Patient Response to Learning: Yes    Patient's Behavior: Anxious    Is Patient Social/Interacting: Yes    Relaxation:   Television:Yes   Reading:No   Game/Puzzle:No   Phone: Yes       Notes/Comments:      Please see patient's wrap up group sheet for patient's comments       Electronically signed by Alicja Tobar on 3/6/22 at 9:21 PM CST

## 2022-03-07 NOTE — PROGRESS NOTES
CLINICAL PHARMACY NOTE: MEDS TO BEDS    Total # of Prescriptions Filled: 2   The following medications were delivered to the patient:  · Trazodone 50 mg  · Prazosin 1 mg    Additional Documentation:    Handed scripts to [address]) at pharmacy window.

## 2022-03-07 NOTE — PROGRESS NOTES
Discharge Note     Pt discharging on this date. Pt denies SI, HI, and AVH at this time. Pt reports improvement in behavior and is leaving unit in overall good condition. SW and pt discussed pt's follow up appointments and importance of attending appointments as scheduled, pt voiced understanding and agreement. Pt and SW also discussed pt safety plan and pt able to verbally identify: warning signs, coping strategies, places and people that help make the pt feel better/distract negative thoughts, friends/family/agencies/professionals the pt can reach out to in a crisis, and something that is important to the pt/worth living for. Pt provided the national suicide prevention hotline number (4-220-388-008-401-3896) as well as local community behavioral health [facility]) crisis number for emergencies (2-883-932-509-713-1420). Pt to follow up [address] for medication management, patient will be seen on __/__/22 @ 00:00 AM/PM for the med management appt.      Referral to out patient tobacco cessation counseling treatment:    Patient refused referral to outpatient tobacco cessation counseling    SW offered to assist pt with transportation, pt reports that she has transportation home

## 2022-03-07 NOTE — PLAN OF CARE
Problem: Pain:  Description: Pain management should include both nonpharmacologic and pharmacologic interventions.   Goal: Pain level will decrease  Description: Pain level will decrease  Outcome: Ongoing  Goal: Control of acute pain  Description: Control of acute pain  Outcome: Ongoing  Goal: Control of chronic pain  Description: Control of chronic pain  Outcome: Ongoing     Problem: Depressive Behavior With or Without Suicide Precautions:  Goal: Able to verbalize acceptance of life and situations over which he or she has no control  Description: Able to verbalize acceptance of life and situations over which he or she has no control  Outcome: Ongoing  Goal: Able to verbalize and/or display a decrease in depressive symptoms  Description: Able to verbalize and/or display a decrease in depressive symptoms  Outcome: Ongoing  Goal: Ability to disclose and discuss suicidal ideas will improve  Description: Ability to disclose and discuss suicidal ideas will improve  Outcome: Ongoing  Goal: Able to verbalize support systems  Description: Able to verbalize support systems  Outcome: Ongoing  Goal: Absence of self-harm  Description: Absence of self-harm  Outcome: Ongoing  Goal: Patient specific goal  Description: Patient specific goal  Outcome: Ongoing  Goal: Participates in care planning  Description: Participates in care planning  Outcome: Ongoing     Problem: Risk of Harm:  Goal: Ability to remain free from injury will improve  Description: Ability to remain free from injury will improve  Outcome: Ongoing     Problem: Suicide risk  Description: Suicide risk  Goal: Provide patient with safe environment  Description: Provide patient with safe environment  Outcome: Ongoing     Problem: Falls - Risk of:  Goal: Will remain free from falls  Description: Will remain free from falls  Outcome: Ongoing  Goal: Absence of physical injury  Description: Absence of physical injury  Outcome: Ongoing Patient expressed no known problems or needs

## 2022-03-07 NOTE — PROGRESS NOTES
Treatment Team Note:     SW met with [address] team to discuss Pts [address] plans.      Progress/Behavior/Group Attendance: TBD     Target Symptoms/Reason for admission: Patient admitted to [facility] due [facility] from [place], [facility] from [facility]. Patient came to [facility] for suicidal ideations. Pt was brought by EMS and patient found on floor by EMS with patient stating \" I just want to end it\" and with left hand superficial laceration from her cutting herself. Pt was tearful and sobbing severely, pt has been seen in the ER a couple of time in past couple of days. Also patient stated that she signed over custody of her son to her mother. pt health issues of Bipolar, Depression and anxiety. pt states that has psuedo seizures and hit her head and has head pain.   Diagnoses: Bipolar I disorder with mixed features , Borderline Personality Disorder  Chronic PTSD, Cannabis use disorder, Tobacco use disorder   UDS: Neg  BAL:  Neg     [address]     Pt lives with: mother     Collateral obtained from: refused  On:     Family Session: STEPHEN     Misc:

## 2022-03-07 NOTE — PLAN OF CARE
Problem: Pain:  Description: Pain management should include both nonpharmacologic and pharmacologic interventions.   Goal: Pain level will decrease  Description: Pain level will decrease  3/7/2022 0925 by Kim Monge RN  Outcome: Completed  3/7/2022 0924 by Kim Monge RN  Outcome: Ongoing  Goal: Control of acute pain  Description: Control of acute pain  3/7/2022 0925 by Kim Monge RN  Outcome: Completed  3/7/2022 0924 by Kim Monge RN  Outcome: Ongoing  Goal: Control of chronic pain  Description: Control of chronic pain  3/7/2022 0925 by Kim Monge RN  Outcome: Completed  3/7/2022 0924 by Kim Monge RN  Outcome: Ongoing     Problem: Depressive Behavior With or Without Suicide Precautions:  Goal: Able to verbalize acceptance of life and situations over which he or she has no control  Description: Able to verbalize acceptance of life and situations over which he or she has no control  3/7/2022 0925 by Kim Monge RN  Outcome: Completed  3/7/2022 0924 by Kim Monge RN  Outcome: Ongoing  Goal: Able to verbalize and/or display a decrease in depressive symptoms  Description: Able to verbalize and/or display a decrease in depressive symptoms  3/7/2022 0925 by Kim Monge RN  Outcome: Completed  3/7/2022 0924 by Kim Monge RN  Outcome: Ongoing  Goal: Ability to disclose and discuss suicidal ideas will improve  Description: Ability to disclose and discuss suicidal ideas will improve  3/7/2022 0925 by Kim Monge RN  Outcome: Completed  3/7/2022 0924 by Kim Monge RN  Outcome: Ongoing  Goal: Able to verbalize support systems  Description: Able to verbalize support systems  3/7/2022 0925 by Kim Monge RN  Outcome: Completed  3/7/2022 0924 by Kim Monge RN  Outcome: Ongoing  Goal: Absence of self-harm  Description: Absence of self-harm  3/7/2022 0925 by Kim Monge RN  Outcome: Completed  3/7/2022 0924 by Kim Monge RN  Outcome: Ongoing  Goal: Patient specific goal  Description: Patient specific goal  3/7/2022 0925 by Itzel Payton RN  Outcome: Completed  3/7/2022 0924 by Itzel Payton RN  Outcome: Ongoing  Goal: Participates in care planning  Description: Participates in care planning  3/7/2022 0925 by Itzel Payton RN  Outcome: Completed  3/7/2022 0924 by Itzel Payton RN  Outcome: Ongoing     Problem: Risk of Harm:  Goal: Ability to remain free from injury will improve  Description: Ability to remain free from injury will improve  3/7/2022 0925 by Itzel Payton RN  Outcome: Completed  3/7/2022 0924 by Itzel Payton RN  Outcome: Ongoing     Problem: Suicide risk  Description: Suicide risk  Goal: Provide patient with safe environment  Description: Provide patient with safe environment  3/7/2022 0925 by Itzel Payton RN  Outcome: Completed  3/7/2022 0924 by Itzel Payton RN  Outcome: Ongoing     Problem: Falls - Risk of:  Goal: Will remain free from falls  Description: Will remain free from falls  3/7/2022 0925 by Itzel Payton RN  Outcome: Completed  3/7/2022 0924 by Itzel Payton RN  Outcome: Ongoing  Goal: Absence of physical injury  Description: Absence of physical injury  3/7/2022 0925 by Itzel Payton RN  Outcome: Completed  3/7/2022 0924 by Itzel Payton RN  Outcome: Ongoing

## 2022-03-08 NOTE — PROGRESS NOTES
Progress Note  Gabriela Jacobsen  3/7/2022 8:25 PM  Subjective:   Admit Date:   3/2/2022      CC/ADMIT DX:       Interval History:   Reviewed overnight events and nursing notes. She has no new physical complaints. I have reviewed all labs/diagnostics from the last 24hrs. ROS:   I have done a 10 point ROS and all are negative, except what is mentioned in the HPI. No diet orders on file    Medications:             Objective:   Vitals: /75   Pulse 100   Temp 98.1 °F (36.7 °C) (Temporal)   Resp 20   Ht 5' 7\" (1.702 m)   Wt 218 lb 3.2 oz (99 kg)   SpO2 97%   BMI 34.17 kg/m²      Intake/Output Summary (Last 24 hours) at 3/7/2022 2025  Last data filed at 3/7/2022 0825  Gross per 24 hour   Intake 240 ml   Output --   Net 240 ml     General appearance: alert and cooperative with exam  Extremities: extremities normal, atraumatic, no cyanosis or edema  Neurologic:  No obvious focal neurologic deficits. Skin: no rashes    Assessment and Plan:   Principal Problem:    Bipolar I disorder with mixed features (HCC)  Active Problems:    Borderline personality disorder (HCC)    Tobacco use disorder    PTSD (post-traumatic stress disorder)    Cannabis use disorder, moderate, dependence ([facility] [address]  Resolved Problems:    Depression    Nausea    Plan:  1. Continue present medication(s)   2. Follow with Psych  3. She is medically stable. I will monitor for any changes or concerns. Discharge planning:   her home     Reviewed treatment plans with the patient and/or family.              Electronically signed by Bridgette Baires MD on 3/7/2022 at 8:25 PM

## 2022-03-09 LAB
HIV INTERPRETATION: NEGATIVE
HIV-1 ANTIBODY: NEGATIVE
HIV-2 AB: NEGATIVE

## 2023-10-03 NOTE — PROGRESS NOTES
[facility] Daily Shift Assessment  Nursing Progress Note    Room: [facility]/601-01 Name: Isela Holter Age: 36 y.o. Gender: female   Dx: Bipolar I disorder with mixed features ([address]  Precautions: suicide risk  Target Symptoms:   Accu-Chek: NoSleep: Yes,Sleep Quality Fair SI No AVH denies [address] [place] [address]  ADLs: Yes Speech: normal Depression: 10 Anxiety: 10   Participation LevelActive Listener  Appetite: Good  Respiratory symptoms: No Headache: No Body aches: No Fever: No Cough: No  Patients encouraged to wear masks, wash hands frequently and practice social distancing while on the unit: Yes  Visitation: No   Participation QualityAppropriate and Attentive    Complaints:none at this time    Notes: Patient is alert and oriented x 4. Irritable and anxious this am. . Took shower. Wearing casual attire. Well groomed. Affect is labile. Thought processes are linear. Reports fair appetite and sleep. Compliant with medications. Minimally social, attends some groups.      Signature: Electronically signed by Anabella Juan RN on 3/6/22 at 8:29 AM CST See attached wound care documentation